# Patient Record
Sex: MALE | Race: WHITE | NOT HISPANIC OR LATINO | Employment: FULL TIME | URBAN - METROPOLITAN AREA
[De-identification: names, ages, dates, MRNs, and addresses within clinical notes are randomized per-mention and may not be internally consistent; named-entity substitution may affect disease eponyms.]

---

## 2019-08-19 ENCOUNTER — DOCUMENTATION (OUTPATIENT)
Dept: NEUROSURGERY | Facility: CLINIC | Age: 59
End: 2019-08-19

## 2019-09-03 ENCOUNTER — OFFICE VISIT (OUTPATIENT)
Dept: NEUROSURGERY | Facility: CLINIC | Age: 59
End: 2019-09-03

## 2019-09-03 VITALS
DIASTOLIC BLOOD PRESSURE: 94 MMHG | SYSTOLIC BLOOD PRESSURE: 158 MMHG | HEART RATE: 94 BPM | WEIGHT: 252 LBS | HEIGHT: 73 IN | BODY MASS INDEX: 33.4 KG/M2

## 2019-09-03 DIAGNOSIS — M54.16 LUMBAR RADICULOPATHY: Primary | ICD-10-CM

## 2019-09-03 PROCEDURE — 99204 OFFICE O/P NEW MOD 45 MIN: CPT | Performed by: NEUROLOGICAL SURGERY

## 2019-09-03 RX ORDER — METHOCARBAMOL 750 MG/1
TABLET, FILM COATED ORAL
COMMUNITY
Start: 2019-08-16 | End: 2019-09-17

## 2019-09-03 RX ORDER — DICLOFENAC SODIUM 75 MG/1
TABLET, DELAYED RELEASE ORAL
COMMUNITY
Start: 2019-07-15 | End: 2019-11-05

## 2019-09-03 RX ORDER — PANTOPRAZOLE SODIUM 40 MG/1
TABLET, DELAYED RELEASE ORAL
COMMUNITY
Start: 2019-08-16 | End: 2019-09-10

## 2019-09-03 RX ORDER — DEXAMETHASONE 4 MG/1
8 TABLET ORAL TAKE AS DIRECTED
Qty: 2 TABLET | Refills: 0 | Status: SHIPPED | OUTPATIENT
Start: 2019-09-03 | End: 2019-09-10 | Stop reason: HOSPADM

## 2019-09-06 NOTE — PROGRESS NOTES
Subjective   Patient ID: Philip Calloway is a 59 y.o. male is here today for follow-up with a new Lumbar Myelogram that was ordered at his last office visit 9/3/2019 for back pain that radiated into his right leg with numbness and tingling.  Today his symptoms are the same as his previous visit with the addition of pain in the right side of his abdomen.    History of Present Illness     This patient returns a day.  He continues with pain primarily in his right leg.  He has a little pain in the right side of his abdomen.  This morning.    The following portions of the patient's history were reviewed and updated as appropriate: allergies, current medications, past family history, past medical history, past social history, past surgical history and problem list.    Review of Systems   Constitutional: Positive for activity change.   Respiratory: Negative for chest tightness and shortness of breath.    Cardiovascular: Negative for chest pain.   Gastrointestinal: Positive for abdominal pain ( Right side).   Musculoskeletal: Positive for back pain, gait problem and myalgias.        Right leg pain   Neurological: Positive for numbness.        Positive for tingling   All other systems reviewed and are negative.      Objective   Physical Exam   Constitutional: He is oriented to person, place, and time. He appears well-developed and well-nourished.   Neurological: He is oriented to person, place, and time.     Neurologic Exam     Mental Status   Oriented to person, place, and time.       Assessment/Plan   Independent Review of Radiographic Studies:      I reviewed his plain films, myelogram, and CT scan myself.  The plain films show some degenerative change but no evidence of instability on flexion and extension.  On the myelogram itself there does appear to be some stenosis at the L3-4 level.  There is also a little narrowing at L2-3.  The nerves really fill out pretty well at all the levels of the lumbar spine.  There is some  epidural injection as well.  On the post myelographic CT scan the lower thoracic spine down to the L2 vertebral body looks okay.  There is some left-sided narrowing at L2-3 which is causing some mild pressure on the nerve at that level area L3-4 also shows a little bit of narrowing.  L4-5 for the most part looks okay.  This is where he has had previous surgery on the left.  L5-S1 mostly looks okay as well.    Medical Decision Making:      I told the patient and his wife about the imaging.  I suggested that we try some facet injections on the right side at L3-4 and L4-5.  I told him to call me a day or 2 after the injections have been done and if he is no better then we could consider some epidural blocks.  At some point we might want to check an MRI of his thoracic spine because of the abdominal pain but the CT goes all the way up to the T9-10 disc space.    Philip was seen today for back pain.    Diagnoses and all orders for this visit:    Lumbar radiculopathy  -     Facet Injection      Return for Recheck and call after treatment or consultation.

## 2019-09-09 ENCOUNTER — TELEPHONE (OUTPATIENT)
Dept: NEUROSURGERY | Facility: CLINIC | Age: 59
End: 2019-09-09

## 2019-09-10 ENCOUNTER — OFFICE VISIT (OUTPATIENT)
Dept: NEUROSURGERY | Facility: CLINIC | Age: 59
End: 2019-09-10

## 2019-09-10 ENCOUNTER — HOSPITAL ENCOUNTER (OUTPATIENT)
Dept: CT IMAGING | Facility: HOSPITAL | Age: 59
Discharge: HOME | End: 2019-09-10
Admitting: NEUROLOGICAL SURGERY

## 2019-09-10 ENCOUNTER — HOSPITAL ENCOUNTER (OUTPATIENT)
Dept: GENERAL RADIOLOGY | Facility: HOSPITAL | Age: 59
Discharge: HOME | End: 2019-09-10

## 2019-09-10 VITALS
HEART RATE: 79 BPM | HEIGHT: 72 IN | BODY MASS INDEX: 34.13 KG/M2 | TEMPERATURE: 97.9 F | DIASTOLIC BLOOD PRESSURE: 65 MMHG | WEIGHT: 252 LBS | RESPIRATION RATE: 16 BRPM | OXYGEN SATURATION: 95 % | SYSTOLIC BLOOD PRESSURE: 122 MMHG

## 2019-09-10 VITALS — DIASTOLIC BLOOD PRESSURE: 80 MMHG | HEART RATE: 76 BPM | SYSTOLIC BLOOD PRESSURE: 143 MMHG

## 2019-09-10 DIAGNOSIS — M54.16 LUMBAR RADICULOPATHY: ICD-10-CM

## 2019-09-10 DIAGNOSIS — M54.16 LUMBAR RADICULOPATHY: Primary | ICD-10-CM

## 2019-09-10 PROCEDURE — 99213 OFFICE O/P EST LOW 20 MIN: CPT | Performed by: NEUROLOGICAL SURGERY

## 2019-09-10 PROCEDURE — 25010000003 LIDOCAINE 1 % SOLUTION: Performed by: NEUROLOGICAL SURGERY

## 2019-09-10 PROCEDURE — 62304 MYELOGRAPHY LUMBAR INJECTION: CPT

## 2019-09-10 PROCEDURE — 72132 CT LUMBAR SPINE W/DYE: CPT

## 2019-09-10 PROCEDURE — 72114 X-RAY EXAM L-S SPINE BENDING: CPT

## 2019-09-10 PROCEDURE — 0 IOPAMIDOL 41 % SOLUTION: Performed by: NEUROLOGICAL SURGERY

## 2019-09-10 PROCEDURE — 72240 MYELOGRAPHY NECK SPINE: CPT

## 2019-09-10 RX ORDER — HYDROCODONE BITARTRATE AND ACETAMINOPHEN 5; 325 MG/1; MG/1
1 TABLET ORAL EVERY 4 HOURS PRN
Status: DISCONTINUED | OUTPATIENT
Start: 2019-09-10 | End: 2019-09-11 | Stop reason: HOSPADM

## 2019-09-10 RX ORDER — ACETAMINOPHEN 325 MG/1
650 TABLET ORAL EVERY 4 HOURS PRN
Status: DISCONTINUED | OUTPATIENT
Start: 2019-09-10 | End: 2019-09-11 | Stop reason: HOSPADM

## 2019-09-10 RX ORDER — LIDOCAINE HYDROCHLORIDE 10 MG/ML
10 INJECTION, SOLUTION INFILTRATION; PERINEURAL ONCE
Status: COMPLETED | OUTPATIENT
Start: 2019-09-10 | End: 2019-09-10

## 2019-09-10 RX ADMIN — IOPAMIDOL 20 ML: 408 INJECTION, SOLUTION INTRATHECAL at 07:54

## 2019-09-10 RX ADMIN — LIDOCAINE HYDROCHLORIDE 6 ML: 10 INJECTION, SOLUTION INFILTRATION; PERINEURAL at 07:54

## 2019-09-10 NOTE — NURSING NOTE
Returned to triage. Dressing to low back dry and intact. No complaint of headache. No distress. Fluids encouraged. Awaiting to complete X-rays.

## 2019-09-10 NOTE — DISCHARGE INSTRUCTIONS
EDUCATION /DISCHARGE INSTRUCTIONS:    A myelogram is a special radiology procedure of the spinal cord, spinal nerves and other related structures.  You will be awake during the examination.  An area of your lower back will be cleansed with an antiseptic solution.  The physician will inject a numbing medication in your lower back.  While your back is numb, a needle will be placed in the lower back area.  A small amount of spinal fluid may be withdrawn and sent to the lab if ordered by your physician. While the needle is in the back, an injection of a contrast material (xray dye) will be given through the needle.  The contrast material will allow the physician to see the spinal cord and spinal nerves.  Once injected, the needle will be removed and a band aid will be placed over the injection site.  The table will be tilted during the process to allow the contrast material to flow to particular areas in the spine.  Following the injection and xrays, you will be taken to the CT scan where more pictures will be taken. After the procedure is finished, the contrast material will be absorbed by your body and eliminated through your kidneys.  The radiologist will study and interpret your myelogram and send the results to your physician.  Procedure risks of a myelogram include, but are not limited to:  *  Bleeding   *  seizure  *  Infection   *  Headache, possibly severe requiring  *  Contrast reaction      a blood patch  *  Nerve or cord injury  *  Paralysis and death  Benefits of the procedure:  *  Best examination for delineating pathology related to spinal cord compression from a    disc and/or nerve root compression  Alternatives to the procedure:  MRI - a non invasive procedure requiring intravenous contrast injection.  Cannot be done on patients with certain pacemakers or metal in the body.  MRI risks include possible reaction to the contrast material, movement of metal located in the body.  Benefit to MRI:   Non-invasive and usually painless procedure.  THIS EDUCATION INFORMATION WAS REVIEWED PRIOR TO PROCEDURE AND CONSENT. Patient initials________________Time__________________    24 hour rest period ends ____________________.  Important information following your myelogram:  * ACTIVITY:   *  Lie down with your head elevated no more than 2 pillows high today & tonight  *  Sit up to eat your meals and use the restroom, otherwise, lie down.  *  Remain less active for two to three days.  *  Do not drive for 48 hours following a myelogram  *  You may remove the bandage and shower in the morning  *  Increase your fluids for the next 24 hours.  Caffeinated drinks are encouraged.  Resume taking blood thinner or aspirin on Wednesday Sept 11th after 11am    CALL YOUR PHYSICIAN FOR THE FOLLOWING:  * Pain at the injection site  * Reddness, swelling, bruising or drainage at the injection site.  * A fever by mouth of 101.0 or any new symptoms  Headaches are a common side effect after a myelogram.  If you get a headache, you should stay flat in bed and drink plenty of fluids. If the headache persist and does not go away with rest/medication, CALL Dr. Esquivel at [phone]

## 2019-09-11 ENCOUNTER — TELEPHONE (OUTPATIENT)
Dept: INTERVENTIONAL RADIOLOGY/VASCULAR | Facility: HOSPITAL | Age: 59
End: 2019-09-11

## 2019-09-17 ENCOUNTER — HOSPITAL ENCOUNTER (OUTPATIENT)
Dept: GENERAL RADIOLOGY | Facility: HOSPITAL | Age: 59
Discharge: HOME | End: 2019-09-17

## 2019-09-17 ENCOUNTER — ANESTHESIA (OUTPATIENT)
Dept: PAIN MEDICINE | Facility: HOSPITAL | Age: 59
End: 2019-09-17

## 2019-09-17 ENCOUNTER — ANESTHESIA EVENT (OUTPATIENT)
Dept: PAIN MEDICINE | Facility: HOSPITAL | Age: 59
End: 2019-09-17

## 2019-09-17 ENCOUNTER — HOSPITAL ENCOUNTER (OUTPATIENT)
Dept: PAIN MEDICINE | Facility: HOSPITAL | Age: 59
Discharge: HOME | End: 2019-09-17
Admitting: ANESTHESIOLOGY

## 2019-09-17 VITALS
SYSTOLIC BLOOD PRESSURE: 139 MMHG | WEIGHT: 252 LBS | OXYGEN SATURATION: 96 % | RESPIRATION RATE: 16 BRPM | BODY MASS INDEX: 33.4 KG/M2 | HEIGHT: 73 IN | HEART RATE: 71 BPM | DIASTOLIC BLOOD PRESSURE: 92 MMHG

## 2019-09-17 DIAGNOSIS — M54.16 LUMBAR RADICULOPATHY: Primary | ICD-10-CM

## 2019-09-17 DIAGNOSIS — R52 PAIN: ICD-10-CM

## 2019-09-17 PROCEDURE — 77003 FLUOROGUIDE FOR SPINE INJECT: CPT

## 2019-09-17 PROCEDURE — 25010000002 METHYLPREDNISOLONE PER 80 MG: Performed by: ANESTHESIOLOGY

## 2019-09-17 RX ORDER — LIDOCAINE HYDROCHLORIDE 10 MG/ML
1 INJECTION, SOLUTION INFILTRATION; PERINEURAL ONCE
Status: DISCONTINUED | OUTPATIENT
Start: 2019-09-17 | End: 2019-09-18 | Stop reason: HOSPADM

## 2019-09-17 RX ORDER — FENTANYL CITRATE 50 UG/ML
50 INJECTION, SOLUTION INTRAMUSCULAR; INTRAVENOUS AS NEEDED
Status: DISCONTINUED | OUTPATIENT
Start: 2019-09-17 | End: 2019-09-18 | Stop reason: HOSPADM

## 2019-09-17 RX ORDER — MIDAZOLAM HYDROCHLORIDE 1 MG/ML
1 INJECTION INTRAMUSCULAR; INTRAVENOUS
Status: DISCONTINUED | OUTPATIENT
Start: 2019-09-17 | End: 2019-09-18 | Stop reason: HOSPADM

## 2019-09-17 RX ORDER — LIDOCAINE HYDROCHLORIDE 20 MG/ML
10 INJECTION, SOLUTION INFILTRATION; PERINEURAL ONCE
Status: COMPLETED | OUTPATIENT
Start: 2019-09-17 | End: 2019-09-17

## 2019-09-17 RX ORDER — METHYLPREDNISOLONE ACETATE 80 MG/ML
80 INJECTION, SUSPENSION INTRA-ARTICULAR; INTRALESIONAL; INTRAMUSCULAR; SOFT TISSUE ONCE
Status: COMPLETED | OUTPATIENT
Start: 2019-09-17 | End: 2019-09-17

## 2019-09-17 RX ADMIN — LIDOCAINE HYDROCHLORIDE 10 ML: 20 INJECTION, SOLUTION INFILTRATION; PERINEURAL at 13:13

## 2019-09-17 RX ADMIN — METHYLPREDNISOLONE ACETATE 80 MG: 80 INJECTION, SUSPENSION INTRA-ARTICULAR; INTRALESIONAL; INTRAMUSCULAR; SOFT TISSUE at 13:13

## 2019-09-17 NOTE — H&P
[facility]    History and Physical    Patient Name: Philip Calloway  :  1960  MRN:  [id number]  Date of Admission: 2019    Subjective     Patient is a 59 y.o. male presents with chief complaint of  Right leg pain- aching, burning, muscle spasme, numbness, 3-7/10, ? etiology.  Onset of symptoms was gradual starting a few months ago.  Symptoms are associated/aggravated by nothing in particular. Symptoms improve with pain medication, lying down and rest    The following portions of the patients history were reviewed and updated as appropriate: current medications, allergies, past medical history, past surgical history, past family history, past social history and problem list                Objective     Past Medical History:   Past Medical History:   Diagnosis Date   • Hypertension      Past Surgical History:   Past Surgical History:   Procedure Laterality Date   • BACK SURGERY  1998   • HERNIA REPAIR  2016   • SPINE SURGERY       Family History:   Family History   Problem Relation Age of Onset   • Diabetes Mother    • Cancer Father      Social History:   Social History     Tobacco Use   • Smoking status: Former Smoker     Packs/day: 1.00     Years: 4.00     Pack years: 4.00     Types: Cigarettes   • Smokeless tobacco: Never Used   • Tobacco comment: smoked Cigars before Cigarettes for 20 yrs   Substance Use Topics   • Alcohol use: Yes     Comment: socially   • Drug use: No       Vital Signs Range for the last 24 hours  Temperature:     Temp Source:     BP:     Pulse:     Respirations:     SPO2:     O2 Amount (l/min):     O2 Devices     Weight:           --------------------------------------------------------------------------------    Current Outpatient Medications   Medication Sig Dispense Refill   • diclofenac (VOLTAREN) 75 MG EC tablet      • methocarbamol (ROBAXIN) 750 MG tablet      • oxyCODONE-acetaminophen (PERCOCET)  MG per tablet Take 1 tablet by mouth Every 8 (Eight) Hours As  Needed for Moderate Pain .     • valsartan (DIOVAN) 320 MG tablet Take 320 mg by mouth Daily.       Current Facility-Administered Medications   Medication Dose Route Frequency Provider Last Rate Last Dose   • fentaNYL citrate (PF) (SUBLIMAZE) injection 50 mcg  50 mcg Intravenous PRN Christopher Bennett MD       • iopamidol (ISOVUE-M 200) injection 41%  3 mL Epidural Once in imaging Christopher Bennett MD       • lidocaine (XYLOCAINE) 1 % injection 1 mL  1 mL Intradermal Once Christopher Bennett MD       • lidocaine (XYLOCAINE) 2% injection 10 mL  10 mL Infiltration Once Christopher Bennett MD       • methylPREDNISolone acetate (DEPO-medrol) injection 80 mg  80 mg Intramuscular Once Christopher Bennett MD       • midazolam (VERSED) injection 1 mg  1 mg Intravenous Q5 Min PRN Christopher Bennett MD           --------------------------------------------------------------------------------  Assessment/Plan      Anesthesia Evaluation     Patient summary reviewed and Nursing notes reviewed         Pain impairs ability to perform ADLs: Dressing, Sleeping, Driving and Working  Modalities previously tried to control pain with limited effectiveness within the last 4-6 weeks: Physical therapy, Rest, Prescription medications and Steroids     Airway   Mallampati: II  Dental - normal exam     Pulmonary - negative pulmonary ROS and normal exam   Cardiovascular - normal exam    (+) hypertension,       Neuro/Psych- negative ROS    PE Comment: DTR decreased right patella and achilles  GI/Hepatic/Renal/Endo - negative ROS     Musculoskeletal (-) negative ROS        PE comment: Right Foot dorsi flexion / extension 4/5  Right knee flexion / extension 4/5    All left muscle 5/5  Abdominal  - normal exam   Substance History - negative use     OB/GYN negative ob/gyn ROS         Other                 Diagnosis and Plan    Treatment Plan  ASA 2      Procedures: With fluoroscopy,       Anesthetic plan and risks discussed with patient.      Lumbar 3-4, 4-5,  "right , medial branch block    Diagnosis     * Lumbar radiculopathy [M54.16]     * Lumbar degenerative disc disease [M51.36]     * Lumbosacral spondylosis without myelopathy [M47.817]            CHIEF COMPLAINT:       HISTORY OF PRESENT ILLNESS:      PAST MEDICAL HISTORY:  Current Outpatient Medications on File Prior to Encounter   Medication Sig Dispense Refill   • diclofenac (VOLTAREN) 75 MG EC tablet      • methocarbamol (ROBAXIN) 750 MG tablet      • oxyCODONE-acetaminophen (PERCOCET)  MG per tablet Take 1 tablet by mouth Every 8 (Eight) Hours As Needed for Moderate Pain .     • valsartan (DIOVAN) 320 MG tablet Take 320 mg by mouth Daily.       No current facility-administered medications on file prior to encounter.        Past Medical History:   Diagnosis Date   • Hypertension          SOCIAL HISTORY:  No tobacco    REVIEW OF SYSTEMS:  No hematologic infectious or constitutional symptoms  Other review of systems non-contributory    PHYSICAL EXAM:  Ht 185.4 cm (73\")   Wt 114 kg (252 lb)   BMI 33.25 kg/m²   Well-developed well-nourished no acute distress  Extra ocular movements intact  Mallampati class 2 airway  Cardiac:  Regular rate and rhythm  Lungs:  Clear to auscultation bilaterally with good effort  Alert and oriented ×3  Deep tendon reflexes abnormal in the right  Patella/ achilles  Negative straight leg raise bilaterally  5 out of 5 strength bilateral upper and lower extremities  Except right leg/ foot 4/5  Lumbar spine without obvious deformities ecchymoses  Lumbar spine nontender to palpation      DIAGNOSIS:  Post-Op Diagnosis Codes:     * Lumbar radiculopathy [M54.16]     * Lumbar degenerative disc disease [M51.36]     * Lumbosacral spondylosis without myelopathy [M47.817]    PLAN:  1After medial branch block trial,  Possible Lumbar 4 epidural steroid injections, up to 3, spaced 1-2 weeks apart.  If pain control is acceptable after 1 or 2 injections, it was discussed with the patient that they " may return for the subsequent injections if and when their pain returns.  The risks were discussed with the patient including failure of relief, worsening pain, Headache (post dural puncture headache), bleeding (epidural hematoma) and infection (epidural abscess or skin infection).  2.  Physical therapy exercises at home as prescribed by physical therapy or from the pain clinic handout (given to the patient).  Continuation of these exercises every day, or multiple times per week, even when the patient has good pain relief, was stressed to the patient as a preventative measure to decrease the frequency and severity of future pain episodes.  3.  Continue pain medicines as already prescribed.  If patient not currently taking any, it is recommended to begin Acetaminophen 1000 mg po q 8 hours.  If other medicines containing Acetaminophen are currently prescribed, maintain daily dose at 3000 mg.    4.  If they can tolerate NSAIDS, it is recommended to take Ibuprofen 600 mg po q 6 hours for 7 days during pain exacerbations.  Alternatively, they may substitute an NSAID of their choice (e.g. Aleve).  This may be taken at the same time as Acetaminophen.  5.  Heat and ice to the affected area as tolerated for pain control.  It was discussed that heating pads can cause burns.  6.  Daily low impact exercise such as walking or water exercise was recommended to maintain overall health and aid in weight control.   7.  Follow up as needed for subsequent injections.  8.  Patient was counseled to abstain from tobacco products.    Target : L 4-5

## 2019-09-17 NOTE — H&P (VIEW-ONLY)
[facility]    History and Physical    Patient Name: Philip Calloway  :  1960  MRN:  [id number]  Date of Admission: 2019    Subjective     Patient is a 59 y.o. male presents with chief complaint of  Right leg pain- aching, burning, muscle spasme, numbness, 3-7/10, ? etiology.  Onset of symptoms was gradual starting a few months ago.  Symptoms are associated/aggravated by nothing in particular. Symptoms improve with pain medication, lying down and rest    The following portions of the patients history were reviewed and updated as appropriate: current medications, allergies, past medical history, past surgical history, past family history, past social history and problem list                Objective     Past Medical History:   Past Medical History:   Diagnosis Date   • Hypertension      Past Surgical History:   Past Surgical History:   Procedure Laterality Date   • BACK SURGERY  1998   • HERNIA REPAIR  2016   • SPINE SURGERY       Family History:   Family History   Problem Relation Age of Onset   • Diabetes Mother    • Cancer Father      Social History:   Social History     Tobacco Use   • Smoking status: Former Smoker     Packs/day: 1.00     Years: 4.00     Pack years: 4.00     Types: Cigarettes   • Smokeless tobacco: Never Used   • Tobacco comment: smoked Cigars before Cigarettes for 20 yrs   Substance Use Topics   • Alcohol use: Yes     Comment: socially   • Drug use: No       Vital Signs Range for the last 24 hours  Temperature:     Temp Source:     BP:     Pulse:     Respirations:     SPO2:     O2 Amount (l/min):     O2 Devices     Weight:           --------------------------------------------------------------------------------    Current Outpatient Medications   Medication Sig Dispense Refill   • diclofenac (VOLTAREN) 75 MG EC tablet      • methocarbamol (ROBAXIN) 750 MG tablet      • oxyCODONE-acetaminophen (PERCOCET)  MG per tablet Take 1 tablet by mouth Every 8 (Eight) Hours As  Needed for Moderate Pain .     • valsartan (DIOVAN) 320 MG tablet Take 320 mg by mouth Daily.       Current Facility-Administered Medications   Medication Dose Route Frequency Provider Last Rate Last Dose   • fentaNYL citrate (PF) (SUBLIMAZE) injection 50 mcg  50 mcg Intravenous PRN Christopher Bennett MD       • iopamidol (ISOVUE-M 200) injection 41%  3 mL Epidural Once in imaging Christopher Bennett MD       • lidocaine (XYLOCAINE) 1 % injection 1 mL  1 mL Intradermal Once Christopher Bennett MD       • lidocaine (XYLOCAINE) 2% injection 10 mL  10 mL Infiltration Once Christopher Bennett MD       • methylPREDNISolone acetate (DEPO-medrol) injection 80 mg  80 mg Intramuscular Once Christopher Bennett MD       • midazolam (VERSED) injection 1 mg  1 mg Intravenous Q5 Min PRN Christopher Bennett MD           --------------------------------------------------------------------------------  Assessment/Plan      Anesthesia Evaluation     Patient summary reviewed and Nursing notes reviewed         Pain impairs ability to perform ADLs: Dressing, Sleeping, Driving and Working  Modalities previously tried to control pain with limited effectiveness within the last 4-6 weeks: Physical therapy, Rest, Prescription medications and Steroids     Airway   Mallampati: II  Dental - normal exam     Pulmonary - negative pulmonary ROS and normal exam   Cardiovascular - normal exam    (+) hypertension,       Neuro/Psych- negative ROS    PE Comment: DTR decreased right patella and achilles  GI/Hepatic/Renal/Endo - negative ROS     Musculoskeletal (-) negative ROS        PE comment: Right Foot dorsi flexion / extension 4/5  Right knee flexion / extension 4/5    All left muscle 5/5  Abdominal  - normal exam   Substance History - negative use     OB/GYN negative ob/gyn ROS         Other                 Diagnosis and Plan    Treatment Plan  ASA 2      Procedures: With fluoroscopy,       Anesthetic plan and risks discussed with patient.      Lumbar 3-4, 4-5,  "right , medial branch block    Diagnosis     * Lumbar radiculopathy [M54.16]     * Lumbar degenerative disc disease [M51.36]     * Lumbosacral spondylosis without myelopathy [M47.817]            CHIEF COMPLAINT:       HISTORY OF PRESENT ILLNESS:      PAST MEDICAL HISTORY:  Current Outpatient Medications on File Prior to Encounter   Medication Sig Dispense Refill   • diclofenac (VOLTAREN) 75 MG EC tablet      • methocarbamol (ROBAXIN) 750 MG tablet      • oxyCODONE-acetaminophen (PERCOCET)  MG per tablet Take 1 tablet by mouth Every 8 (Eight) Hours As Needed for Moderate Pain .     • valsartan (DIOVAN) 320 MG tablet Take 320 mg by mouth Daily.       No current facility-administered medications on file prior to encounter.        Past Medical History:   Diagnosis Date   • Hypertension          SOCIAL HISTORY:  No tobacco    REVIEW OF SYSTEMS:  No hematologic infectious or constitutional symptoms  Other review of systems non-contributory    PHYSICAL EXAM:  Ht 185.4 cm (73\")   Wt 114 kg (252 lb)   BMI 33.25 kg/m²   Well-developed well-nourished no acute distress  Extra ocular movements intact  Mallampati class 2 airway  Cardiac:  Regular rate and rhythm  Lungs:  Clear to auscultation bilaterally with good effort  Alert and oriented ×3  Deep tendon reflexes abnormal in the right  Patella/ achilles  Negative straight leg raise bilaterally  5 out of 5 strength bilateral upper and lower extremities  Except right leg/ foot 4/5  Lumbar spine without obvious deformities ecchymoses  Lumbar spine nontender to palpation      DIAGNOSIS:  Post-Op Diagnosis Codes:     * Lumbar radiculopathy [M54.16]     * Lumbar degenerative disc disease [M51.36]     * Lumbosacral spondylosis without myelopathy [M47.817]    PLAN:  1After medial branch block trial,  Possible Lumbar 4 epidural steroid injections, up to 3, spaced 1-2 weeks apart.  If pain control is acceptable after 1 or 2 injections, it was discussed with the patient that they " may return for the subsequent injections if and when their pain returns.  The risks were discussed with the patient including failure of relief, worsening pain, Headache (post dural puncture headache), bleeding (epidural hematoma) and infection (epidural abscess or skin infection).  2.  Physical therapy exercises at home as prescribed by physical therapy or from the pain clinic handout (given to the patient).  Continuation of these exercises every day, or multiple times per week, even when the patient has good pain relief, was stressed to the patient as a preventative measure to decrease the frequency and severity of future pain episodes.  3.  Continue pain medicines as already prescribed.  If patient not currently taking any, it is recommended to begin Acetaminophen 1000 mg po q 8 hours.  If other medicines containing Acetaminophen are currently prescribed, maintain daily dose at 3000 mg.    4.  If they can tolerate NSAIDS, it is recommended to take Ibuprofen 600 mg po q 6 hours for 7 days during pain exacerbations.  Alternatively, they may substitute an NSAID of their choice (e.g. Aleve).  This may be taken at the same time as Acetaminophen.  5.  Heat and ice to the affected area as tolerated for pain control.  It was discussed that heating pads can cause burns.  6.  Daily low impact exercise such as walking or water exercise was recommended to maintain overall health and aid in weight control.   7.  Follow up as needed for subsequent injections.  8.  Patient was counseled to abstain from tobacco products.    Target : L 4-5

## 2019-09-17 NOTE — ANESTHESIA PROCEDURE NOTES
Medial branch block    Pre-sedation assessment completed: 9/17/2019 1:05 PM    Patient reassessed immediately prior to procedure    Patient location during procedure: pain clinic  Start Time: 9/17/2019 1:05 PM  Stop Time: 9/17/2019 1:15 PM  Indication:at surgeon's request and procedure for pain  Performed By  Anesthesiologist: Christopher Bennett MD  Preanesthetic Checklist  Completed: patient identified, site marked, surgical consent, pre-op evaluation, timeout performed, IV checked, risks and benefits discussed and monitors and equipment checked  Additional Notes  Dx:  Post-Op Diagnosis Codes:     * Lumbar radiculopathy (M54.16)     * Lumbar degenerative disc disease (M51.36)     * Lumbosacral spondylosis without myelopathy (M47.817)      Plan : return to clinic as needed  Prep:  Pt Position:prone (prone)  Sterile Tech:cap, gloves, mask and sterile barrier  Prep:chlorhexidine gluconate and isopropyl alcohol  Monitoring:blood pressure monitoring, EKG and continuous pulse oximetry  Procedure:Sedation: no     Approach:right paramedian  Guidance: fluoroscopy and c arm pa and lat and loss of resistance  Location:lumbar  Interspace: L3-4, L4-5.  Needle Type:Other  Needle Gauge:22 G  Aspiration:negative  Medications:  Preservative Free Saline:3mL  Comments:Lidocaine 2%  1cc  Post Assessment:  Post-procedure: bandaid.  Pt Tolerance:patient tolerated the procedure well with no apparent complications  Complications:no

## 2019-09-25 ENCOUNTER — ANESTHESIA EVENT (OUTPATIENT)
Dept: PAIN MEDICINE | Facility: HOSPITAL | Age: 59
End: 2019-09-25

## 2019-09-25 ENCOUNTER — HOSPITAL ENCOUNTER (OUTPATIENT)
Dept: PAIN MEDICINE | Facility: HOSPITAL | Age: 59
Discharge: HOME | End: 2019-09-25
Admitting: ANESTHESIOLOGY

## 2019-09-25 ENCOUNTER — ANESTHESIA (OUTPATIENT)
Dept: PAIN MEDICINE | Facility: HOSPITAL | Age: 59
End: 2019-09-25

## 2019-09-25 ENCOUNTER — HOSPITAL ENCOUNTER (OUTPATIENT)
Dept: GENERAL RADIOLOGY | Facility: HOSPITAL | Age: 59
Discharge: HOME | End: 2019-09-25

## 2019-09-25 VITALS
DIASTOLIC BLOOD PRESSURE: 85 MMHG | HEART RATE: 63 BPM | SYSTOLIC BLOOD PRESSURE: 122 MMHG | RESPIRATION RATE: 16 BRPM | TEMPERATURE: 98 F | OXYGEN SATURATION: 95 %

## 2019-09-25 DIAGNOSIS — M54.16 LUMBAR RADICULOPATHY: ICD-10-CM

## 2019-09-25 DIAGNOSIS — R52 PAIN: ICD-10-CM

## 2019-09-25 PROCEDURE — C1755 CATHETER, INTRASPINAL: HCPCS

## 2019-09-25 PROCEDURE — 25010000002 MIDAZOLAM PER 1 MG: Performed by: ANESTHESIOLOGY

## 2019-09-25 PROCEDURE — 25010000002 METHYLPREDNISOLONE PER 80 MG: Performed by: ANESTHESIOLOGY

## 2019-09-25 PROCEDURE — 77003 FLUOROGUIDE FOR SPINE INJECT: CPT

## 2019-09-25 RX ORDER — SODIUM CHLORIDE 0.9 % (FLUSH) 0.9 %
3-10 SYRINGE (ML) INJECTION AS NEEDED
Status: DISCONTINUED | OUTPATIENT
Start: 2019-09-25 | End: 2019-09-26 | Stop reason: HOSPADM

## 2019-09-25 RX ORDER — MIDAZOLAM HYDROCHLORIDE 1 MG/ML
1 INJECTION INTRAMUSCULAR; INTRAVENOUS AS NEEDED
Status: DISCONTINUED | OUTPATIENT
Start: 2019-09-25 | End: 2019-09-26 | Stop reason: HOSPADM

## 2019-09-25 RX ORDER — SODIUM CHLORIDE 0.9 % (FLUSH) 0.9 %
1-10 SYRINGE (ML) INJECTION AS NEEDED
Status: DISCONTINUED | OUTPATIENT
Start: 2019-09-25 | End: 2019-09-26 | Stop reason: HOSPADM

## 2019-09-25 RX ORDER — METHYLPREDNISOLONE ACETATE 80 MG/ML
80 INJECTION, SUSPENSION INTRA-ARTICULAR; INTRALESIONAL; INTRAMUSCULAR; SOFT TISSUE ONCE
Status: COMPLETED | OUTPATIENT
Start: 2019-09-25 | End: 2019-09-25

## 2019-09-25 RX ORDER — FENTANYL CITRATE 50 UG/ML
50 INJECTION, SOLUTION INTRAMUSCULAR; INTRAVENOUS AS NEEDED
Status: DISCONTINUED | OUTPATIENT
Start: 2019-09-25 | End: 2019-09-26 | Stop reason: HOSPADM

## 2019-09-25 RX ORDER — LIDOCAINE HYDROCHLORIDE 10 MG/ML
1 INJECTION, SOLUTION INFILTRATION; PERINEURAL ONCE AS NEEDED
Status: DISCONTINUED | OUTPATIENT
Start: 2019-09-25 | End: 2019-09-26 | Stop reason: HOSPADM

## 2019-09-25 RX ORDER — LIDOCAINE HYDROCHLORIDE 10 MG/ML
0.5 INJECTION, SOLUTION INFILTRATION; PERINEURAL ONCE AS NEEDED
Status: DISCONTINUED | OUTPATIENT
Start: 2019-09-25 | End: 2019-09-26 | Stop reason: HOSPADM

## 2019-09-25 RX ORDER — SODIUM CHLORIDE 0.9 % (FLUSH) 0.9 %
3 SYRINGE (ML) INJECTION EVERY 12 HOURS SCHEDULED
Status: DISCONTINUED | OUTPATIENT
Start: 2019-09-25 | End: 2019-09-26 | Stop reason: HOSPADM

## 2019-09-25 RX ADMIN — METHYLPREDNISOLONE ACETATE 80 MG: 80 INJECTION, SUSPENSION INTRA-ARTICULAR; INTRALESIONAL; INTRAMUSCULAR; SOFT TISSUE at 11:04

## 2019-09-25 RX ADMIN — MIDAZOLAM 1 MG: 1 INJECTION INTRAMUSCULAR; INTRAVENOUS at 11:01

## 2019-09-25 NOTE — ANESTHESIA PROCEDURE NOTES
PAIN Epidural block    Pre-sedation assessment completed: 9/25/2019 10:42 AM    Patient reassessed immediately prior to procedure    Patient location during procedure: pain clinic  Start Time: 9/25/2019 11:01 AM  Stop Time: 9/25/2019 11:05 AM  Indication:procedure for pain  Performed By  Anesthesiologist: Deb Bates MD  Preanesthetic Checklist  Completed: patient identified, site marked, surgical consent, pre-op evaluation, timeout performed, IV checked, risks and benefits discussed and monitors and equipment checked  Additional Notes  Lumbar radiculopathy  Fluoro used.    Prep:  Pt Position:prone  Sterile Tech:cap, gloves, mask and sterile barrier  Prep:chlorhexidine gluconate and isopropyl alcohol  Monitoring:blood pressure monitoring, continuous pulse oximetry and EKG  Procedure:Sedation: yes     Approach:right paramedian  Guidance: fluoroscopy  Location:lumbar  Level:4-5  Needle Type:Tuohy  Needle Gauge:20  Aspiration:negative  Test Dose:negative  Medications:  Depomedrol:80  Preservative Free Saline:3mL    Post Assessment:  Dressing:occlusive dressing applied  Pt Tolerance:patient tolerated the procedure well with no apparent complications  Complications:no

## 2019-09-25 NOTE — INTERVAL H&P NOTE
[facility]  H&P reviewed. No changes since last visit.  Patient states   25-50% improvement since the last procedure/injection.  Pain relief from prior lumbar medial nerve branch blocks lasted 3-4 days.  Pain has returned.  Primarily c/o right leg pain/numbness.  Presents for lesi.      Diagnosis     * Lumbar radiculopathy [M54.16]      Airway assessed since last visit. Airway class equals: 2.

## 2019-11-05 ENCOUNTER — ANESTHESIA (OUTPATIENT)
Dept: PAIN MEDICINE | Facility: HOSPITAL | Age: 59
End: 2019-11-05

## 2019-11-05 ENCOUNTER — ANESTHESIA EVENT (OUTPATIENT)
Dept: PAIN MEDICINE | Facility: HOSPITAL | Age: 59
End: 2019-11-05

## 2019-11-05 ENCOUNTER — HOSPITAL ENCOUNTER (OUTPATIENT)
Dept: PAIN MEDICINE | Facility: HOSPITAL | Age: 59
Discharge: HOME | End: 2019-11-05
Admitting: ANESTHESIOLOGY

## 2019-11-05 ENCOUNTER — HOSPITAL ENCOUNTER (OUTPATIENT)
Dept: GENERAL RADIOLOGY | Facility: HOSPITAL | Age: 59
Discharge: HOME | End: 2019-11-05

## 2019-11-05 VITALS
DIASTOLIC BLOOD PRESSURE: 91 MMHG | OXYGEN SATURATION: 94 % | TEMPERATURE: 97.7 F | HEART RATE: 60 BPM | RESPIRATION RATE: 16 BRPM | SYSTOLIC BLOOD PRESSURE: 137 MMHG

## 2019-11-05 DIAGNOSIS — M54.16 LUMBAR RADICULOPATHY: ICD-10-CM

## 2019-11-05 DIAGNOSIS — R52 PAIN: ICD-10-CM

## 2019-11-05 PROCEDURE — C1755 CATHETER, INTRASPINAL: HCPCS

## 2019-11-05 PROCEDURE — 77003 FLUOROGUIDE FOR SPINE INJECT: CPT

## 2019-11-05 PROCEDURE — 25010000002 METHYLPREDNISOLONE PER 80 MG: Performed by: ANESTHESIOLOGY

## 2019-11-05 PROCEDURE — 0 IOPAMIDOL 41 % SOLUTION: Performed by: ANESTHESIOLOGY

## 2019-11-05 RX ORDER — MELOXICAM 7.5 MG/1
7.5 TABLET ORAL DAILY
COMMUNITY
End: 2020-01-06 | Stop reason: ALTCHOICE

## 2019-11-05 RX ORDER — SODIUM CHLORIDE 0.9 % (FLUSH) 0.9 %
3 SYRINGE (ML) INJECTION EVERY 12 HOURS SCHEDULED
Status: DISCONTINUED | OUTPATIENT
Start: 2019-11-05 | End: 2019-11-06 | Stop reason: HOSPADM

## 2019-11-05 RX ORDER — METHYLPREDNISOLONE ACETATE 80 MG/ML
80 INJECTION, SUSPENSION INTRA-ARTICULAR; INTRALESIONAL; INTRAMUSCULAR; SOFT TISSUE ONCE
Status: COMPLETED | OUTPATIENT
Start: 2019-11-05 | End: 2019-11-05

## 2019-11-05 RX ORDER — FENTANYL CITRATE 50 UG/ML
50 INJECTION, SOLUTION INTRAMUSCULAR; INTRAVENOUS AS NEEDED
Status: DISCONTINUED | OUTPATIENT
Start: 2019-11-05 | End: 2019-11-06 | Stop reason: HOSPADM

## 2019-11-05 RX ORDER — MIDAZOLAM HYDROCHLORIDE 1 MG/ML
1 INJECTION INTRAMUSCULAR; INTRAVENOUS AS NEEDED
Status: DISCONTINUED | OUTPATIENT
Start: 2019-11-05 | End: 2019-11-06 | Stop reason: HOSPADM

## 2019-11-05 RX ORDER — SODIUM CHLORIDE 0.9 % (FLUSH) 0.9 %
3-10 SYRINGE (ML) INJECTION AS NEEDED
Status: DISCONTINUED | OUTPATIENT
Start: 2019-11-05 | End: 2019-11-06 | Stop reason: HOSPADM

## 2019-11-05 RX ORDER — LIDOCAINE HYDROCHLORIDE 10 MG/ML
1 INJECTION, SOLUTION INFILTRATION; PERINEURAL ONCE
Status: DISCONTINUED | OUTPATIENT
Start: 2019-11-05 | End: 2019-11-06 | Stop reason: HOSPADM

## 2019-11-05 RX ADMIN — METHYLPREDNISOLONE ACETATE 80 MG: 80 INJECTION, SUSPENSION INTRA-ARTICULAR; INTRALESIONAL; INTRAMUSCULAR; SOFT TISSUE at 15:18

## 2019-11-05 RX ADMIN — IOPAMIDOL 10 ML: 408 INJECTION, SOLUTION INTRATHECAL at 15:18

## 2019-11-05 NOTE — ANESTHESIA PROCEDURE NOTES
PAIN Epidural block    Pre-sedation assessment completed: 11/5/2019 3:07 PM    Patient reassessed immediately prior to procedure    Patient location during procedure: pain clinic  Start Time: 11/5/2019 3:11 PM  Stop Time: 11/5/2019 3:19 PM  Indication:procedure for pain  Performed By  Anesthesiologist: Chaz Bliss MD  Preanesthetic Checklist  Completed: patient identified, site marked, surgical consent, pre-op evaluation, timeout performed, IV checked, risks and benefits discussed and monitors and equipment checked  Additional Notes  Post-Op Diagnosis Codes:     * Lumbar radiculopathy (M54.16)    The patient was observed in recovery with no evidence of neurological deficits or other problems. All questions were answered. The patient was discharged with appropriate instructions.  Prep:  Pt Position:prone  Sterile Tech:cap, gloves, mask and sterile barrier  Prep:chlorhexidine gluconate and isopropyl alcohol  Monitoring:blood pressure monitoring, continuous pulse oximetry and EKG  Procedure:Sedation: no     Approach: Right of midline.  Guidance: fluoroscopy  Location:lumbar  Level:3-4  Needle Type:Tuohy  Needle Gauge:20 G  Aspiration:negative  Medications:  Depomedrol:80 mg  Preservative Free Saline:3mL  Isovue:1mL  Comments:Appropriate epidural spread of contrast.   Post Assessment:  Post-procedure: Dressing per nursing.  Pt Tolerance:patient tolerated the procedure well with no apparent complications  Complications:no

## 2019-11-05 NOTE — H&P
CHIEF COMPLAINT:  Low back pain        HISTORY OF PRESENT ILLNESS:  This patient is complaining of low back pain which radiates to his right anterior thigh and right groin.  The pain is significantly decreasing the patient's Activities of Daily Living.  His prior epidural was at the L4-L5 level which is also his postsurgical level (he had a discectomy).    The patient states that the prior epidural provided significant relief, greater than 50%, that has faded.  It also increase his activities of daily living.  He states that he and his physical therapist are wondering if we should try moving up one level based on his physical exam symptoms.    This patient was referred to our clinic by Dr. Samuel Esquivel for lumbar epidural steroid injections.        PAST MEDICAL HISTORY:  Current Outpatient Medications on File Prior to Encounter   Medication Sig Dispense Refill   • meloxicam (MOBIC) 7.5 MG tablet Take 7.5 mg by mouth Daily.     • oxyCODONE-acetaminophen (PERCOCET)  MG per tablet Take 1 tablet by mouth Every 8 (Eight) Hours As Needed for Moderate Pain .     • valsartan (DIOVAN) 320 MG tablet Take 320 mg by mouth Daily.     • [DISCONTINUED] diclofenac (VOLTAREN) 75 MG EC tablet        No current facility-administered medications on file prior to encounter.        Past Medical History:   Diagnosis Date   • Anxiety    • Arthritis    • Hypertension    • Low back pain    • Peripheral neuropathy        SOCIAL HISTORY:  Counseled to avoid tobacco     REVIEW OF SYSTEMS:  No pertinent hematologic, infectious, or constitutional symptoms.  Other review of systems non-contributory     PHYSICAL EXAM:  BP (!) 146/101 (BP Location: Left arm, Patient Position: Sitting)   Pulse 62   Temp 36.5 °C (97.7 °F) (Oral)   Resp 16   SpO2 95%   Constitutional: Well-developed well-nourished no acute distress  General: Alert and oriented  Ophtho: EOMI  Airway: Mallampati 2  Cardiac:  Regular rate  Lungs:  Clear to auscultation  bilaterally   GI: soft, nontender  Cervical Spine: Noncontributory  Sacroiliac Joints: Noncontributory  Musc: Decreased range of motion and pain with forward flexion  Neuro: Straight leg raise test is positive on the right     DIAGNOSIS:  Post-Op Diagnosis Codes:  Post-Op Diagnosis Codes:     * Lumbar radiculopathy [M54.16]       PLAN:  -  Lumbar epidural steroid injection at the planned level of L3-L4 on the right side.  This will be a change from the L4-L5 level where he had the epidural last time. If pain control is acceptable after this injection, it was discussed with the patient that they may return for the subsequent injections if and when their pain returns.    - Risks were discussed with the patient, including but not limited to: failure of relief, worsening pain, tissue, nerve, blood vessel or spinal cord damage, post-dural-puncture headache, bleeding, epidural hematoma, infection, and epidural abscess. Benefits and alternatives were also discussed. No promises were made. The patient voiced understanding.  -  Physical therapy exercises at home should be considered, as tolerated, as prescribed by physical therapy or from the pain clinic handout (given to the patient).  Continuation of these exercises every day, or multiple times per week, even when the patient has good pain relief, was stressed to the patient as a preventative measure to decrease the frequency and severity of future pain episodes.  -  It is recommended that the patient use the least amount of opioid medication possible.     -  Heat and ice to the affected area as tolerated for pain control.  It was discussed that heating pads can cause burns.  -  Daily low impact exercise such as walking or water exercise was recommended to maintain overall health and aid in weight control.   -  Patient was counseled to abstain from tobacco products.  -  Follow up as needed for subsequent injections.  -My understanding is that this patient is a possible  surgical candidate in the future.  However, I will leave that between him and his spine surgeon.    Chaz Bliss M.D.  [facility], [facility]  Board Certified Pain Medicine Specialist by the American Board of Anesthesiology  Board Certified Anesthesiologist by the American Board of Anesthesiology     Much of this encounter note is an electronic transcription/translation of spoken language to printed text. The electronic translation of spoken language may permit erroneous, or at times, nonsensical words or phrases to be inadvertently transcribed. Although I have reviewed the note for such errors, some may still exist.

## 2020-01-03 NOTE — PROGRESS NOTES
Subjective   Patient ID: Philip Calloway is a 59 y.o. male is here today for follow-up. He was last seen 9/10/2019 for back pain that radiated into his right leg with numbness and tingling. He also had right side abdomen pain.  Today his symptoms are back pain that radiates into bilateral lower extremity's with weakness. He has had 2 Lumbar GABRIEL and 1 facet. He currently takes Mobic and Percocet.    History of Present Illness    This patient returns today.  He continues with pain in his back with radiation into his right leg.  His right-sided abdominal pain is gone.  Since he was here last his left leg is gotten worse.  He is now walking with a cane.    The following portions of the patient's history were reviewed and updated as appropriate: allergies, current medications, past family history, past medical history, past social history, past surgical history and problem list.    Review of Systems   Respiratory: Negative for chest tightness and shortness of breath.    Cardiovascular: Negative for chest pain.   Musculoskeletal: Positive for back pain.        Bilateral lower extremity pain   Neurological: Positive for weakness.   All other systems reviewed and are negative.      Objective   Physical Exam   Constitutional: He is oriented to person, place, and time. He appears well-developed and well-nourished.   Neurological: He is oriented to person, place, and time.     Neurologic Exam     Mental Status   Oriented to person, place, and time.       Assessment/Plan   Independent Review of Radiographic Studies:      I reviewed an MRI done on November 21 of this last year.  This shows similar changes to the myelogram that he had in September of last year.  I reviewed that test again as well.  This does show some stenosis at L3-4 and L4-5.  The post myelographic CT scan there is some lateral recess stenosis bilaterally at L3-4.  At L4-5 that shows some foraminal stenosis on the left primarily.  L5-S1 mostly looks okay some  disc bulging and a little foraminal stenosis.  He has had previous surgery on the left at L4-5    Medical Decision Making:      I told the patient and his wife about the imaging.  I think that if any surgery were done he would require a decompression at least at L3 L4-5 and may be at L5-S1 as well.  I told the patient about the risks, complications and expected outcome of the lumbar surgery.  I explained that there was an 80% chance of getting rid of the pain in the leg.  I explained that there would still be back pain after the surgery.  Initially this will be quite severe but will improve over time.  There is a 2 or 3% chance of infection, bleeding, CSF leak, damage to the nerve as a result of surgery, paralysis, as well as anesthetic risk.  There is a 10% chance of recurrent problems.  There is a 10% chance of nonunion or failure of the instrumentation.  We discussed the postoperative hospital and home course.  The patient does ask proceed.  I explained as well why he would need a fusion.  We will get him into see an orthopedic surgeon for a second opinion as well as for a fusion evaluation.    He will need to be scheduled for a: Lumbar 3 to lumbar 4 and lumbar 4 lumbar 5 laminectomy and neural lysis the fusion by orthopedics and possible lumbar 5 sacral 1    Philip was seen today for back pain and leg pain.    Diagnoses and all orders for this visit:    Lumbar radiculopathy  -     Ambulatory Referral to Orthopedic Surgery      Return for 2-3 week post op.

## 2020-01-06 ENCOUNTER — PREP FOR SURGERY (OUTPATIENT)
Dept: OTHER | Facility: HOSPITAL | Age: 60
End: 2020-01-06

## 2020-01-06 ENCOUNTER — OFFICE VISIT (OUTPATIENT)
Dept: NEUROSURGERY | Facility: CLINIC | Age: 60
End: 2020-01-06

## 2020-01-06 VITALS — HEART RATE: 79 BPM | DIASTOLIC BLOOD PRESSURE: 88 MMHG | SYSTOLIC BLOOD PRESSURE: 152 MMHG

## 2020-01-06 DIAGNOSIS — M54.16 LUMBAR RADICULOPATHY: Primary | ICD-10-CM

## 2020-01-06 PROCEDURE — 99213 OFFICE O/P EST LOW 20 MIN: CPT | Performed by: NEUROLOGICAL SURGERY

## 2020-01-06 RX ORDER — MELOXICAM 15 MG/1
15 TABLET ORAL DAILY
Status: ON HOLD | COMMUNITY
Start: 2019-12-04 | End: 2020-03-02

## 2020-01-06 RX ORDER — LORAZEPAM 1 MG/1
.5-1 TABLET ORAL 2 TIMES DAILY PRN
COMMUNITY
Start: 2019-11-14 | End: 2020-06-02

## 2020-01-06 RX ORDER — CEFAZOLIN SODIUM 2 G/100ML
2 INJECTION, SOLUTION INTRAVENOUS ONCE
Status: CANCELLED | OUTPATIENT
Start: 2020-02-26 | End: 2020-01-06

## 2020-01-14 ENCOUNTER — OFFICE VISIT (OUTPATIENT)
Dept: ORTHOPEDIC SURGERY | Facility: CLINIC | Age: 60
End: 2020-01-14

## 2020-01-14 VITALS — HEIGHT: 73 IN | BODY MASS INDEX: 34.46 KG/M2 | WEIGHT: 260 LBS | TEMPERATURE: 98.2 F

## 2020-01-14 DIAGNOSIS — M48.062 SPINAL STENOSIS OF LUMBAR REGION WITH NEUROGENIC CLAUDICATION: ICD-10-CM

## 2020-01-14 DIAGNOSIS — M43.16 SPONDYLOLISTHESIS OF LUMBAR REGION: Primary | ICD-10-CM

## 2020-01-14 PROCEDURE — 99204 OFFICE O/P NEW MOD 45 MIN: CPT | Performed by: ORTHOPAEDIC SURGERY

## 2020-01-14 NOTE — PROGRESS NOTES
New patient or new problem visit    Chief Complaint   Patient presents with   • Lumbar Spine - Pain, Establish Care       HPI: He is seen today complaining of low back pain which radiates into the legs bilaterally and is associated with weakness therein.  He also notes a burning aching pain in the legs which is constant.  Pain is moderate to severe and fails to improve with facet blocks and physical therapy although epidural injections did help significantly but temporarily.  He is seen today at request of Dr. Miranda.  Currently he takes Percocet and meloxicam for this.    PFSH: See chart- reviewed    Review of Systems   Constitutional: Positive for activity change and fatigue. Negative for chills, fever and unexpected weight change.   HENT: Positive for sneezing and tinnitus. Negative for trouble swallowing and voice change.    Eyes: Negative for visual disturbance.   Respiratory: Positive for shortness of breath. Negative for cough.    Cardiovascular: Positive for palpitations and leg swelling (renita legs). Negative for chest pain.   Gastrointestinal: Positive for rectal pain. Negative for abdominal pain, nausea and vomiting.   Endocrine: Negative for cold intolerance and heat intolerance.   Genitourinary: Negative for difficulty urinating, frequency and urgency.   Musculoskeletal: Positive for arthralgias, back pain and myalgias.   Skin: Negative for rash and wound.   Allergic/Immunologic: Negative for immunocompromised state.   Neurological: Positive for weakness and numbness (right leg).   Hematological: Does not bruise/bleed easily.   Psychiatric/Behavioral: Positive for dysphoric mood. The patient is nervous/anxious.        PE: Constitutional: Vital signs above-noted.  Awake, alert and oriented    Psychiatric: Affect and insight do not appear grossly disturbed.    Pulmonary: Breathing is unlabored, color is good.    Skin: Warm, dry and normal turgor    Cardiac: Pedal pulses intact.  No edema.    Eyesight and  hearing appear adequate for examination purposes      Musculoskeletal:  There is some tenderness to percussion and palpation of the spine. Motion appears undisturbed.  Posture is unremarkable to coronal and sagittal inspection.    The skin about the area is intact.  There is no palpable or visible deformity.  There is no local spasm.       Neurologic:   Reflexes are 2+ and symmetrical in the patellae and absent in the Achilles.   Motor function is undisturbed in quadriceps, EHL, and gastrocnemius   sensation appears symmetrically intact to light touch except for an area in the lateral right leg which is numb.  In the bilateral lower extremities there is no evidence of atrophy.   Clonus is absent..  Gait appears undisturbed.  SLR test negative      MEDICAL DECISION MAKING    XRAY: Plain film x-rays lumbar spine show loss of lordosis, L3-4 chondrocalcinosis, and multilevel moderate disc degeneration.  As these were taken after the myelographic contrast injection this could represent L3-4 intradiscal contrast agent.  For purposes of surgical planning it does not really matter which.  At L4-5 there is a hint of anterolisthesis.  On myelogram and CT scan there appears to be less than unstable motion at L4-5 on flexion-extension films but no obvious neurologic compression.  Post myelogram CT scan demonstrates mild stenosis at L2-3 moderate changes at 3 4 and 4 5 including some foraminal narrowing from disc protrusion on the right side at L4-5.    Other: I reviewed Dr. Clark's notes and impression with which I agree.  He is also concerned about L5-S1.    Impression: He has loss of lordosis and multilevel degenerative changes but more leg than back pain.  There is L4-5 spondylolisthesis.  I would be inclined to confine the fusion to any decompressed levels which I believe will be L3-4 and L4-5.  If at L5-S1 is significantly decompressed we will fuse it as well.    Plan: L3-5 fusion with instrumentation and laminectomy by  Dr. Esquivel.  I discussed the risks and benefits of posterior spinal fusion, including where applicable, laminectomy.  Risks include adverse anesthetic events such as death, stroke and myocardial infarction.  More specific surgical risks include infection, nonunion, hardware failure, spinal fluid leakage, nerve root injury or paralysis, visceral or vascular injury, persistent pain, deep venous thrombosis, and pulmonary embolism among others. There is a 70 to 90 % chance of success.   Alternatives have been discussed.  After careful consideration the patient wishes to proceed with surgery.

## 2020-01-16 ENCOUNTER — TELEPHONE (OUTPATIENT)
Dept: NEUROSURGERY | Facility: CLINIC | Age: 60
End: 2020-01-16

## 2020-01-16 RX ORDER — CEFAZOLIN SODIUM 2 G/100ML
2 INJECTION, SOLUTION INTRAVENOUS ONCE
Status: CANCELLED | OUTPATIENT
Start: 2020-03-02 | End: 2020-01-16

## 2020-01-29 PROBLEM — M43.16 SPONDYLOLISTHESIS OF LUMBAR REGION: Status: ACTIVE | Noted: 2020-01-29

## 2020-01-29 PROBLEM — M48.062 SPINAL STENOSIS OF LUMBAR REGION WITH NEUROGENIC CLAUDICATION: Status: ACTIVE | Noted: 2020-01-29

## 2020-01-30 DIAGNOSIS — M43.16 SPONDYLOLISTHESIS OF LUMBAR REGION: Primary | ICD-10-CM

## 2020-01-30 DIAGNOSIS — M48.062 SPINAL STENOSIS OF LUMBAR REGION WITH NEUROGENIC CLAUDICATION: ICD-10-CM

## 2020-02-07 ENCOUNTER — TELEPHONE (OUTPATIENT)
Dept: NEUROSURGERY | Facility: CLINIC | Age: 60
End: 2020-02-07

## 2020-02-07 NOTE — TELEPHONE ENCOUNTER
I have left 2 messaged for pt to call to discuss surgery date.      ----- Message from Kerrie Stevens MA sent at 1/28/2020  1:50 PM EST -----  Please call patient to schedule surgery

## 2020-02-11 NOTE — TELEPHONE ENCOUNTER
"I spoke with pt regarding his surgery date. I also gave him a \"pre-op\" appt with Dr. Esquivel for 2/13.  "

## 2020-02-12 NOTE — PROGRESS NOTES
Subjective   Patient ID: Philip Calloway is a 59 y.o. male is here today for follow-up. Mr. Calloway was last seen 1/6/2020 for back pain that radiated into bilateral lower extremity's with weakness.  Today his symptoms are unchanged from his previous visit.     History of Present Illness     This patient continues with pain in his back with radiation primarily into his right leg.  His left leg also hurts.  He is having a lot of trouble walking.  He has no difficulty with bowel bladder control or other associated symptoms    The following portions of the patient's history were reviewed and updated as appropriate: allergies, current medications, past family history, past medical history, past social history, past surgical history and problem list.    Review of Systems   Constitutional: Positive for activity change.   Respiratory: Negative for chest tightness and shortness of breath.    Cardiovascular: Positive for leg swelling. Negative for chest pain.   Musculoskeletal: Positive for back pain and gait problem.        Bilateral leg pain   Neurological: Positive for weakness.   All other systems reviewed and are negative.      Objective   Physical Exam   Constitutional: He is oriented to person, place, and time. He appears well-developed and well-nourished.   Eyes: Pupils are equal, round, and reactive to light. EOM are normal.   Neurological: He is oriented to person, place, and time. He has a normal Finger-Nose-Finger Test and a normal Heel to Shin Test. Gait normal.   Reflex Scores:       Tricep reflexes are 2+ on the right side and 2+ on the left side.       Bicep reflexes are 2+ on the right side and 2+ on the left side.       Brachioradialis reflexes are 2+ on the right side and 2+ on the left side.       Patellar reflexes are 2+ on the right side and 2+ on the left side.       Achilles reflexes are 2+ on the right side and 2+ on the left side.  Psychiatric: His speech is normal.     Neurologic Exam     Mental  Status   Oriented to person, place, and time.   Registration of memory: Good recent and remote memory.   Attention: normal. Concentration: normal.   Speech: speech is normal   Level of consciousness: alert  Knowledge: consistent with education.     Cranial Nerves     CN II   Visual fields full to confrontation.   Visual acuity: normal    CN III, IV, VI   Pupils are equal, round, and reactive to light.  Extraocular motions are normal.     CN V   Facial sensation intact.   Right corneal reflex: normal  Left corneal reflex: normal    CN VII   Facial expression full, symmetric.   Right facial weakness: none  Left facial weakness: none    CN VIII   Hearing: intact    CN IX, X   Palate: symmetric    CN XI   Right sternocleidomastoid strength: normal  Left sternocleidomastoid strength: normal    CN XII   Tongue: not atrophic  Tongue deviation: none    Motor Exam   Muscle bulk: normal  Right arm tone: normal  Left arm tone: normal  Right leg tone: normal  Left leg tone: normal    Strength   Strength 5/5 except as noted.     Sensory Exam   Light touch normal.     Gait, Coordination, and Reflexes     Gait  Gait: normal    Coordination   Finger to nose coordination: normal  Heel to shin coordination: normal    Reflexes   Right brachioradialis: 2+  Left brachioradialis: 2+  Right biceps: 2+  Left biceps: 2+  Right triceps: 2+  Left triceps: 2+  Right patellar: 2+  Left patellar: 2+  Right achilles: 2+  Left achilles: 2+  Right : 2+  Left : 2+      Assessment/Plan   Independent Review of Radiographic Studies:      I reviewed an MRI done on 21 November of last year.  This looks similar to his myelogram that was done in 2018.  This shows severe stenosis at L3-4 and L4-5 but the other levels mostly look okay.  He has had previous surgery on the left and L4-5.    Medical Decision Making:      I told the patient it would be reasonable to proceed with surgery at this point.  I told him that he has some swelling in his ankles  and we should really get cardiac clearance prior to surgery but I did tell him if he wanted to check in with his PCP just to be sure that he would not be willing to clear him if he is then we can hold off on cardiac clearance.  I told the patient about the risks, complications and expected outcome of the lumbar surgery.  I explained that there was an 80% chance of getting rid of the pain in the leg.  I explained that there would still be back pain after the surgery.  Initially this will be quite severe but will improve over time.  There is a 2 or 3% chance of infection, bleeding, CSF leak, damage to the nerve as a result of surgery, paralysis, as well as anesthetic risk.  There is a 10% chance of recurrent problems.  There is a 10% chance of nonunion or failure of the instrumentation.  We discussed the postoperative hospital and home course.  The patient does ask proceed.    He will need to be scheduled for a:Lumbar 3 to lumbar 4 and lumbar 4 lumbar 5 laminectomy and neural lysis the fusion by orthopedics    Philip was seen today for back pain.    Diagnoses and all orders for this visit:    Spinal stenosis of lumbar region with neurogenic claudication  -     Ambulatory Referral to Cardiology    Lumbar radiculopathy    Spondylolisthesis of lumbar region      Return for 2-3 week post op.

## 2020-02-13 ENCOUNTER — OFFICE VISIT (OUTPATIENT)
Dept: NEUROSURGERY | Facility: CLINIC | Age: 60
End: 2020-02-13

## 2020-02-13 VITALS — DIASTOLIC BLOOD PRESSURE: 93 MMHG | HEART RATE: 70 BPM | SYSTOLIC BLOOD PRESSURE: 155 MMHG

## 2020-02-13 DIAGNOSIS — M48.062 SPINAL STENOSIS OF LUMBAR REGION WITH NEUROGENIC CLAUDICATION: Primary | ICD-10-CM

## 2020-02-13 DIAGNOSIS — M43.16 SPONDYLOLISTHESIS OF LUMBAR REGION: ICD-10-CM

## 2020-02-13 DIAGNOSIS — M54.16 LUMBAR RADICULOPATHY: ICD-10-CM

## 2020-02-13 PROCEDURE — 99213 OFFICE O/P EST LOW 20 MIN: CPT | Performed by: NEUROLOGICAL SURGERY

## 2020-02-18 ENCOUNTER — OFFICE VISIT (OUTPATIENT)
Dept: CARDIOLOGY | Facility: CLINIC | Age: 60
End: 2020-02-18

## 2020-02-18 VITALS
SYSTOLIC BLOOD PRESSURE: 148 MMHG | HEART RATE: 63 BPM | DIASTOLIC BLOOD PRESSURE: 98 MMHG | WEIGHT: 263 LBS | BODY MASS INDEX: 34.85 KG/M2 | HEIGHT: 73 IN

## 2020-02-18 DIAGNOSIS — Z01.810 PREOPERATIVE CARDIOVASCULAR EXAMINATION: Primary | ICD-10-CM

## 2020-02-18 PROCEDURE — 93000 ELECTROCARDIOGRAM COMPLETE: CPT | Performed by: INTERNAL MEDICINE

## 2020-02-18 PROCEDURE — 99204 OFFICE O/P NEW MOD 45 MIN: CPT | Performed by: INTERNAL MEDICINE

## 2020-02-18 NOTE — PROGRESS NOTES
Subjective:     Encounter Date:02/18/2020      Patient ID: Philip Calloway is a 59 y.o. male.    Chief Complaint: Preoperative evaluation    HPI:   This is a 59-year-old man who presents for preoperative evaluation prior to spine surgery.  He was recently evaluated by Dr. Esquivel in neurosurgery for chronic low back pain.He has severe spinal stenosis at L3-4 and L4-5, and is scheduled to have laminectomy and fusion in early March.  During the exam, Dr. Miranda noted lower extremity edema and referred him for preoperative evaluation with me.    The patient today states that for over 6 months he has been relatively incapacitated.  He does not do any regular physical activity.  He cannot walk for more than 5 minutes on flat ground before having to stop due to low back pain and dyspnea/fatigue.  He does not climb stairs.  He does minimal housework.  He is gained about 10 pounds since August when he was hospitalized with back pain. He has no orthopnea or PND.  He notes that he is had lower extremity edema for some time which is worsened over the last few months.  There is no abdominal distention or scrotal edema.    About 10 years ago he had a nuclear stress test to evaluate palpitations.  Apparently that was normal.      He has no cardiac history outside of hypertension.  He was a 1 pack/day smoker and quit in 2019.  He had a grandfather with heart disease but no first-degree relatives.  The following portions of the patient's history were reviewed and updated as appropriate: allergies, current medications, past family history, past medical history, past social history, past surgical history and problem list.     REVIEW OF SYSTEMS:   All systems reviewed.  Pertinent positives identified in HPI.  All other systems are negative.    Past Medical History:   Diagnosis Date   • Anxiety    • Arthritis    • Hypertension    • Low back pain    • Peripheral neuropathy    • Spinal stenosis of lumbar region with neurogenic  claudication        Family History   Problem Relation Age of Onset   • Diabetes Mother    • Cancer Father        Social History     Socioeconomic History   • Marital status:      Spouse name: Not on file   • Number of children: 8   • Years of education: Not on file   • Highest education level: High school graduate   Occupational History   • Occupation: manager/sales   Social Needs   • Financial resource strain: Patient refused   • Food insecurity:     Worry: Patient refused     Inability: Patient refused   • Transportation needs:     Medical: Patient refused     Non-medical: Patient refused   Tobacco Use   • Smoking status: Former Smoker     Packs/day: 1.00     Years: 4.00     Pack years: 4.00     Types: Cigarettes     Last attempt to quit: 2019     Years since quittin.5   • Smokeless tobacco: Never Used   • Tobacco comment: daily caffeine  use   Substance and Sexual Activity   • Alcohol use: Yes     Comment: socially   • Drug use: No   • Sexual activity: Defer       No Known Allergies    Past Surgical History:   Procedure Laterality Date   • BACK SURGERY  1998   • EPIDURAL BLOCK     • HERNIA REPAIR  2016   • SPINE SURGERY           ECG 12 Lead  Date/Time: 2020 2:58 PM  Performed by: Linda Jarquin MD  Authorized by: Linda Jarquin MD   Comparison: not compared with previous ECG   Rhythm: sinus rhythm  Rate: normal  Conduction: conduction normal  ST Segments: ST segments normal  T Waves: T waves normal  QRS axis: normal  Other: no other findings    Clinical impression: normal ECG               Objective:         PHYSICAL EXAM:  GEN: VSS, no distress,   Eyes: normal sclera, normal lids and lashes  HENT: moist mucus membranes,   Respiratory: CTAB, no rales or wheezes  CV: RRR, no murmurs, , +2 DP and 2+ carotid pulses b/l  GI: NABS, soft,  Nontender, nondistended  MSK: +2 edema to the midshin bilaterally, no scoliosis or kyphosis  Skin: no rash, warm, dry  Heme/Lymph: no bruising or  bleeding  Psych: organized thought, normal behavior and affect  Neuro: Cranial nerves grossly intact, Alert and Oriented x 3.         Assessment:          Diagnosis Plan   1. Preoperative cardiovascular examination  Adult Transthoracic Echo Complete W/ Cont if Necessary Per Protocol    Stress Test With Myocardial Perfusion          Plan:       1.  Preoperative evaluation: The patient is unable to exercise due to his low back pain.  I am therefore unable to assess his exercise capacity.  For that reason, he will need to complete a stress test prior to this intermediate risk surgery.  He has a number of cardiovascular risk factors including heavy prior smoking and hypertension, as well as obesity.    2.  Lower extremity edema: He has +2 bilateral edema to the midshin.  It is unlikely this is related to heart failure as he is lung sounds are clear and he has no JVD.  Additionally, there are no other symptoms of heart failure.  We will get an echocardiogram to further evaluate.    Dr. Esquivel, thank you very much for referring this kind patient to me. Please call me with any questions or concerns. I will see the patient again in the office as needed based on the results of his testing.          Linda Jarquin MD  02/18/20  [facility]    Outpatient Encounter Medications as of 2/18/2020   Medication Sig Dispense Refill   • LORazepam (ATIVAN) 1 MG tablet      • meloxicam (MOBIC) 15 MG tablet      • oxyCODONE-acetaminophen (PERCOCET)  MG per tablet Take 1 tablet by mouth Every 8 (Eight) Hours As Needed for Moderate Pain .     • valsartan (DIOVAN) 320 MG tablet Take 320 mg by mouth Daily.       No facility-administered encounter medications on file as of 2/18/2020.

## 2020-02-20 ENCOUNTER — HOSPITAL ENCOUNTER (OUTPATIENT)
Dept: CARDIOLOGY | Facility: HOSPITAL | Age: 60
Discharge: HOME | End: 2020-02-20
Admitting: INTERNAL MEDICINE

## 2020-02-20 ENCOUNTER — TELEPHONE (OUTPATIENT)
Dept: CARDIOLOGY | Facility: CLINIC | Age: 60
End: 2020-02-20

## 2020-02-20 ENCOUNTER — HOSPITAL ENCOUNTER (OUTPATIENT)
Dept: CARDIOLOGY | Facility: HOSPITAL | Age: 60
Discharge: HOME | End: 2020-02-20

## 2020-02-20 ENCOUNTER — APPOINTMENT (OUTPATIENT)
Dept: PREADMISSION TESTING | Facility: HOSPITAL | Age: 60
End: 2020-02-20

## 2020-02-20 VITALS
SYSTOLIC BLOOD PRESSURE: 150 MMHG | HEART RATE: 73 BPM | DIASTOLIC BLOOD PRESSURE: 100 MMHG | HEIGHT: 73 IN | BODY MASS INDEX: 34.85 KG/M2 | WEIGHT: 263 LBS

## 2020-02-20 VITALS — WEIGHT: 262.35 LBS | BODY MASS INDEX: 34.77 KG/M2 | HEIGHT: 73 IN

## 2020-02-20 DIAGNOSIS — Z01.810 PREOPERATIVE CARDIOVASCULAR EXAMINATION: ICD-10-CM

## 2020-02-20 LAB
AORTIC ARCH: 2.5 CM
AORTIC ROOT ANNULUS: 2.5 CM
ASCENDING AORTA: 3.8 CM
BH CV ECHO MEAS - ACS: 2.3 CM
BH CV ECHO MEAS - AO MAX PG (FULL): 2.8 MMHG
BH CV ECHO MEAS - AO MAX PG: 6 MMHG
BH CV ECHO MEAS - AO MEAN PG (FULL): 1.5 MMHG
BH CV ECHO MEAS - AO MEAN PG: 3.5 MMHG
BH CV ECHO MEAS - AO V2 MAX: 122.6 CM/SEC
BH CV ECHO MEAS - AO V2 MEAN: 89.7 CM/SEC
BH CV ECHO MEAS - AO V2 VTI: 24.4 CM
BH CV ECHO MEAS - ASC AORTA: 3.8 CM
BH CV ECHO MEAS - AVA(I,A): 2.5 CM^2
BH CV ECHO MEAS - AVA(I,D): 2.5 CM^2
BH CV ECHO MEAS - AVA(V,A): 2.1 CM^2
BH CV ECHO MEAS - AVA(V,D): 2.1 CM^2
BH CV ECHO MEAS - BSA(HAYCOCK): 2.5 M^2
BH CV ECHO MEAS - BSA: 2.4 M^2
BH CV ECHO MEAS - BZI_BMI: 34.7 KILOGRAMS/M^2
BH CV ECHO MEAS - BZI_METRIC_HEIGHT: 185.4 CM
BH CV ECHO MEAS - BZI_METRIC_WEIGHT: 119.3 KG
BH CV ECHO MEAS - EDV(MOD-SP2): 81 ML
BH CV ECHO MEAS - EDV(MOD-SP4): 109 ML
BH CV ECHO MEAS - EDV(TEICH): 91.8 ML
BH CV ECHO MEAS - EF(CUBED): 70.7 %
BH CV ECHO MEAS - EF(MOD-BP): 61 %
BH CV ECHO MEAS - EF(MOD-SP2): 60.5 %
BH CV ECHO MEAS - EF(MOD-SP4): 60.6 %
BH CV ECHO MEAS - EF(TEICH): 62.5 %
BH CV ECHO MEAS - ESV(MOD-SP2): 32 ML
BH CV ECHO MEAS - ESV(MOD-SP4): 43 ML
BH CV ECHO MEAS - ESV(TEICH): 34.4 ML
BH CV ECHO MEAS - FS: 33.6 %
BH CV ECHO MEAS - IVS/LVPW: 0.95
BH CV ECHO MEAS - IVSD: 1.2 CM
BH CV ECHO MEAS - LAT PEAK E' VEL: 9 CM/SEC
BH CV ECHO MEAS - LV DIASTOLIC VOL/BSA (35-75): 45.1 ML/M^2
BH CV ECHO MEAS - LV MASS(C)D: 200 GRAMS
BH CV ECHO MEAS - LV MASS(C)DI: 82.7 GRAMS/M^2
BH CV ECHO MEAS - LV MAX PG: 3.2 MMHG
BH CV ECHO MEAS - LV MEAN PG: 2 MMHG
BH CV ECHO MEAS - LV SYSTOLIC VOL/BSA (12-30): 17.8 ML/M^2
BH CV ECHO MEAS - LV V1 MAX: 89.1 CM/SEC
BH CV ECHO MEAS - LV V1 MEAN: 68.8 CM/SEC
BH CV ECHO MEAS - LV V1 VTI: 21.8 CM
BH CV ECHO MEAS - LVIDD: 4.5 CM
BH CV ECHO MEAS - LVIDS: 3 CM
BH CV ECHO MEAS - LVLD AP2: 7.3 CM
BH CV ECHO MEAS - LVLD AP4: 8.7 CM
BH CV ECHO MEAS - LVLS AP2: 6.2 CM
BH CV ECHO MEAS - LVLS AP4: 7.6 CM
BH CV ECHO MEAS - LVOT AREA (M): 2.8 CM^2
BH CV ECHO MEAS - LVOT AREA: 2.8 CM^2
BH CV ECHO MEAS - LVOT DIAM: 1.9 CM
BH CV ECHO MEAS - LVPWD: 1.2 CM
BH CV ECHO MEAS - MED PEAK E' VEL: 7 CM/SEC
BH CV ECHO MEAS - MV A DUR: 0.11 SEC
BH CV ECHO MEAS - MV A MAX VEL: 62.1 CM/SEC
BH CV ECHO MEAS - MV DEC SLOPE: 172.2 CM/SEC^2
BH CV ECHO MEAS - MV DEC TIME: 0.29 SEC
BH CV ECHO MEAS - MV E MAX VEL: 49.7 CM/SEC
BH CV ECHO MEAS - MV E/A: 0.8
BH CV ECHO MEAS - MV MAX PG: 2.5 MMHG
BH CV ECHO MEAS - MV MEAN PG: 1 MMHG
BH CV ECHO MEAS - MV P1/2T MAX VEL: 51 CM/SEC
BH CV ECHO MEAS - MV P1/2T: 86.7 MSEC
BH CV ECHO MEAS - MV V2 MAX: 78.3 CM/SEC
BH CV ECHO MEAS - MV V2 MEAN: 47.1 CM/SEC
BH CV ECHO MEAS - MV V2 VTI: 26.3 CM
BH CV ECHO MEAS - MVA P1/2T LCG: 4.3 CM^2
BH CV ECHO MEAS - MVA(P1/2T): 2.5 CM^2
BH CV ECHO MEAS - MVA(VTI): 2.4 CM^2
BH CV ECHO MEAS - PA ACC TIME: 0.07 SEC
BH CV ECHO MEAS - PA MAX PG (FULL): 1.8 MMHG
BH CV ECHO MEAS - PA MAX PG: 3.6 MMHG
BH CV ECHO MEAS - PA PR(ACCEL): 45.7 MMHG
BH CV ECHO MEAS - PA V2 MAX: 94.7 CM/SEC
BH CV ECHO MEAS - PULM A REVS DUR: 0.11 SEC
BH CV ECHO MEAS - PULM A REVS VEL: 43 CM/SEC
BH CV ECHO MEAS - PULM DIAS VEL: 50.5 CM/SEC
BH CV ECHO MEAS - PULM S/D: 1.2
BH CV ECHO MEAS - PULM SYS VEL: 58.4 CM/SEC
BH CV ECHO MEAS - PVA(V,A): 2.6 CM^2
BH CV ECHO MEAS - PVA(V,D): 2.6 CM^2
BH CV ECHO MEAS - QP/QS: 0.53
BH CV ECHO MEAS - RAP SYSTOLE: 3 MMHG
BH CV ECHO MEAS - RV MAX PG: 1.7 MMHG
BH CV ECHO MEAS - RV MEAN PG: 0.67 MMHG
BH CV ECHO MEAS - RV V1 MAX: 66 CM/SEC
BH CV ECHO MEAS - RV V1 MEAN: 37.3 CM/SEC
BH CV ECHO MEAS - RV V1 VTI: 8.9 CM
BH CV ECHO MEAS - RVOT AREA: 3.7 CM^2
BH CV ECHO MEAS - RVOT DIAM: 2.2 CM
BH CV ECHO MEAS - RVSP: 23 MMHG
BH CV ECHO MEAS - SI(CUBED): 26.4 ML/M^2
BH CV ECHO MEAS - SI(LVOT): 25.6 ML/M^2
BH CV ECHO MEAS - SI(MOD-SP2): 20.3 ML/M^2
BH CV ECHO MEAS - SI(MOD-SP4): 27.3 ML/M^2
BH CV ECHO MEAS - SI(TEICH): 23.7 ML/M^2
BH CV ECHO MEAS - SUP REN AO DIAM: 2.3 CM
BH CV ECHO MEAS - SV(CUBED): 63.8 ML
BH CV ECHO MEAS - SV(LVOT): 62 ML
BH CV ECHO MEAS - SV(MOD-SP2): 49 ML
BH CV ECHO MEAS - SV(MOD-SP4): 66 ML
BH CV ECHO MEAS - SV(RVOT): 33.1 ML
BH CV ECHO MEAS - SV(TEICH): 57.4 ML
BH CV ECHO MEAS - TAPSE (>1.6): 2.7 CM2
BH CV ECHO MEAS - TR MAX VEL: 222.6 CM/SEC
BH CV ECHO MEASUREMENTS AVERAGE E/E' RATIO: 6.21
BH CV NUCLEAR PRIOR STUDY: 2
BH CV STRESS BP STAGE 1: NORMAL
BH CV STRESS COMMENTS STAGE 1: NORMAL
BH CV STRESS DOSE REGADENOSON STAGE 1: 0.4
BH CV STRESS DURATION MIN STAGE 1: 0
BH CV STRESS DURATION SEC STAGE 1: 10
BH CV STRESS HR STAGE 1: 98
BH CV STRESS PROTOCOL 1: NORMAL
BH CV STRESS RECOVERY BP: NORMAL MMHG
BH CV STRESS RECOVERY HR: 78 BPM
BH CV STRESS STAGE 1: 1
BH CV XLRA - RV BASE: 2.8 CM
BH CV XLRA - RV LENGTH: 5.6 CM
BH CV XLRA - RV MID: 2 CM
BH CV XLRA - TDI S': 11 CM/SEC
LEFT ATRIUM VOLUME INDEX: 15 ML/M2
LV EF 2D ECHO EST: 61 %
LV EF NUC BP: 65 %
MAXIMAL PREDICTED HEART RATE: 163 BPM
PERCENT MAX PREDICTED HR: 60.12 %
SINUS: 3.8 CM
STJ: 3.3 CM
STRESS BASELINE BP: NORMAL MMHG
STRESS BASELINE HR: 70 BPM
STRESS PERCENT HR: 71 %
STRESS POST EXERCISE DUR SEC: 10 SEC
STRESS POST PEAK BP: NORMAL MMHG
STRESS POST PEAK HR: 98 BPM
STRESS TARGET HR: 139 BPM

## 2020-02-20 PROCEDURE — 78492 MYOCRD IMG PET MLT RST&STRS: CPT

## 2020-02-20 PROCEDURE — 93306 TTE W/DOPPLER COMPLETE: CPT

## 2020-02-20 PROCEDURE — 78492 MYOCRD IMG PET MLT RST&STRS: CPT | Performed by: INTERNAL MEDICINE

## 2020-02-20 PROCEDURE — 93016 CV STRESS TEST SUPVJ ONLY: CPT | Performed by: INTERNAL MEDICINE

## 2020-02-20 PROCEDURE — 25010000002 REGADENOSON 0.4 MG/5ML SOLUTION: Performed by: INTERNAL MEDICINE

## 2020-02-20 PROCEDURE — 93306 TTE W/DOPPLER COMPLETE: CPT | Performed by: INTERNAL MEDICINE

## 2020-02-20 PROCEDURE — A9555 RB82 RUBIDIUM: HCPCS | Performed by: INTERNAL MEDICINE

## 2020-02-20 PROCEDURE — 93017 CV STRESS TEST TRACING ONLY: CPT

## 2020-02-20 PROCEDURE — 93018 CV STRESS TEST I&R ONLY: CPT | Performed by: INTERNAL MEDICINE

## 2020-02-20 PROCEDURE — 0 RUBIDIUM CHLORIDE: Performed by: INTERNAL MEDICINE

## 2020-02-20 RX ADMIN — RUBIDIUM CHLORIDE RB-82 1 DOSE: 150 INJECTION, SOLUTION INTRAVENOUS at 11:10

## 2020-02-20 RX ADMIN — REGADENOSON 0.4 MG: 0.08 INJECTION, SOLUTION INTRAVENOUS at 11:10

## 2020-02-20 RX ADMIN — RUBIDIUM CHLORIDE RB-82 1 DOSE: 150 INJECTION, SOLUTION INTRAVENOUS at 11:00

## 2020-02-20 NOTE — PROGRESS NOTES
Please call normal results to patient and send clearance to Dr. Esquivel in neurosurgery and Dr Bourne in orthopedics

## 2020-02-20 NOTE — TELEPHONE ENCOUNTER
Pt notified of normal results and voiced understanding.he had no further questions at this time.      clearance letter faxed to  at [phone] and  at [phone]

## 2020-02-20 NOTE — TELEPHONE ENCOUNTER
----- Message from Linda Jarquin MD sent at 2/20/2020  4:10 PM EST -----  Please call normal results to patient and send clearance to Dr. Esquivel in neurosurgery and Dr Bourne in orthopedics

## 2020-02-24 ENCOUNTER — PREP FOR SURGERY (OUTPATIENT)
Dept: OTHER | Facility: HOSPITAL | Age: 60
End: 2020-02-24

## 2020-02-26 ENCOUNTER — APPOINTMENT (OUTPATIENT)
Dept: PREADMISSION TESTING | Facility: HOSPITAL | Age: 60
End: 2020-02-26

## 2020-02-26 ENCOUNTER — PREP FOR SURGERY (OUTPATIENT)
Dept: OTHER | Facility: HOSPITAL | Age: 60
End: 2020-02-26

## 2020-02-26 VITALS
RESPIRATION RATE: 16 BRPM | OXYGEN SATURATION: 97 % | TEMPERATURE: 98 F | DIASTOLIC BLOOD PRESSURE: 91 MMHG | WEIGHT: 264.4 LBS | HEIGHT: 72 IN | HEART RATE: 65 BPM | BODY MASS INDEX: 35.81 KG/M2 | SYSTOLIC BLOOD PRESSURE: 156 MMHG

## 2020-02-26 DIAGNOSIS — M48.062 SPINAL STENOSIS OF LUMBAR REGION WITH NEUROGENIC CLAUDICATION: Primary | ICD-10-CM

## 2020-02-26 LAB
ANION GAP SERPL CALCULATED.3IONS-SCNC: 9.8 MMOL/L (ref 5–15)
BUN BLD-MCNC: 10 MG/DL (ref 6–20)
BUN/CREAT SERPL: 15.2 (ref 7–25)
CALCIUM SPEC-SCNC: 9.1 MG/DL (ref 8.6–10.5)
CHLORIDE SERPL-SCNC: 99 MMOL/L (ref 98–107)
CO2 SERPL-SCNC: 27.2 MMOL/L (ref 22–29)
CREAT BLD-MCNC: 0.66 MG/DL (ref 0.76–1.27)
DEPRECATED RDW RBC AUTO: 39.8 FL (ref 37–54)
ERYTHROCYTE [DISTWIDTH] IN BLOOD BY AUTOMATED COUNT: 12.8 % (ref 12.3–15.4)
GFR SERPL CREATININE-BSD FRML MDRD: 124 ML/MIN/1.73
GLUCOSE BLD-MCNC: 87 MG/DL (ref 65–99)
HCT VFR BLD AUTO: 42.3 % (ref 37.5–51)
HGB BLD-MCNC: 14.3 G/DL (ref 13–17.7)
MCH RBC QN AUTO: 29.1 PG (ref 26.6–33)
MCHC RBC AUTO-ENTMCNC: 33.8 G/DL (ref 31.5–35.7)
MCV RBC AUTO: 86 FL (ref 79–97)
PLATELET # BLD AUTO: 244 10*3/MM3 (ref 140–450)
PMV BLD AUTO: 9.6 FL (ref 6–12)
POTASSIUM BLD-SCNC: 4.4 MMOL/L (ref 3.5–5.2)
RBC # BLD AUTO: 4.92 10*6/MM3 (ref 4.14–5.8)
SODIUM BLD-SCNC: 136 MMOL/L (ref 136–145)
WBC NRBC COR # BLD: 6.58 10*3/MM3 (ref 3.4–10.8)

## 2020-02-26 PROCEDURE — 80048 BASIC METABOLIC PNL TOTAL CA: CPT | Performed by: NEUROLOGICAL SURGERY

## 2020-02-26 PROCEDURE — 85027 COMPLETE CBC AUTOMATED: CPT | Performed by: NEUROLOGICAL SURGERY

## 2020-02-26 PROCEDURE — 36415 COLL VENOUS BLD VENIPUNCTURE: CPT

## 2020-02-26 RX ORDER — CEFAZOLIN SODIUM 2 G/100ML
2 INJECTION, SOLUTION INTRAVENOUS ONCE
Status: CANCELLED | OUTPATIENT
Start: 2020-02-26 | End: 2020-02-26

## 2020-03-02 ENCOUNTER — ANESTHESIA (OUTPATIENT)
Dept: PERIOP | Facility: HOSPITAL | Age: 60
End: 2020-03-02

## 2020-03-02 ENCOUNTER — HOSPITAL ENCOUNTER (OUTPATIENT)
Facility: HOSPITAL | Age: 60
Discharge: HOME | End: 2020-03-05
Attending: ORTHOPAEDIC SURGERY | Admitting: ORTHOPAEDIC SURGERY

## 2020-03-02 ENCOUNTER — APPOINTMENT (OUTPATIENT)
Dept: GENERAL RADIOLOGY | Facility: HOSPITAL | Age: 60
End: 2020-03-02

## 2020-03-02 ENCOUNTER — ANESTHESIA EVENT (OUTPATIENT)
Dept: PERIOP | Facility: HOSPITAL | Age: 60
End: 2020-03-02

## 2020-03-02 DIAGNOSIS — M43.16 SPONDYLOLISTHESIS OF LUMBAR REGION: ICD-10-CM

## 2020-03-02 DIAGNOSIS — M54.16 LUMBAR RADICULOPATHY: Primary | ICD-10-CM

## 2020-03-02 DIAGNOSIS — M48.062 SPINAL STENOSIS OF LUMBAR REGION WITH NEUROGENIC CLAUDICATION: ICD-10-CM

## 2020-03-02 LAB
ABO GROUP BLD: NORMAL
BLD GP AB SCN SERPL QL: NEGATIVE
GLUCOSE BLDC GLUCOMTR-MCNC: 178 MG/DL (ref 70–130)
HCT VFR BLD AUTO: 37 % (ref 37.5–51)
HGB BLD-MCNC: 12.5 G/DL (ref 13–17.7)
RH BLD: POSITIVE
T&S EXPIRATION DATE: NORMAL

## 2020-03-02 PROCEDURE — C1713 ANCHOR/SCREW BN/BN,TIS/BN: HCPCS | Performed by: ORTHOPAEDIC SURGERY

## 2020-03-02 PROCEDURE — 25010000002 ONDANSETRON PER 1 MG: Performed by: NURSE ANESTHETIST, CERTIFIED REGISTERED

## 2020-03-02 PROCEDURE — 22614 ARTHRD PST TQ 1NTRSPC EA ADD: CPT | Performed by: ORTHOPAEDIC SURGERY

## 2020-03-02 PROCEDURE — 25010000002 CEFAZOLIN PER 500 MG: Performed by: ORTHOPAEDIC SURGERY

## 2020-03-02 PROCEDURE — G0378 HOSPITAL OBSERVATION PER HR: HCPCS

## 2020-03-02 PROCEDURE — 20939 BONE MARROW ASPIR BONE GRFG: CPT | Performed by: ORTHOPAEDIC SURGERY

## 2020-03-02 PROCEDURE — 82962 GLUCOSE BLOOD TEST: CPT

## 2020-03-02 PROCEDURE — 25010000002 HEPARIN (PORCINE) PER 1000 UNITS: Performed by: ORTHOPAEDIC SURGERY

## 2020-03-02 PROCEDURE — 25010000002 NEOSTIGMINE PER 0.5 MG: Performed by: NURSE ANESTHETIST, CERTIFIED REGISTERED

## 2020-03-02 PROCEDURE — S0260 H&P FOR SURGERY: HCPCS | Performed by: ORTHOPAEDIC SURGERY

## 2020-03-02 PROCEDURE — 25010000002 DEXAMETHASONE PER 1 MG: Performed by: NURSE ANESTHETIST, CERTIFIED REGISTERED

## 2020-03-02 PROCEDURE — 25010000002 PROPOFOL 10 MG/ML EMULSION: Performed by: NURSE ANESTHETIST, CERTIFIED REGISTERED

## 2020-03-02 PROCEDURE — 76000 FLUOROSCOPY <1 HR PHYS/QHP: CPT

## 2020-03-02 PROCEDURE — 25010000002 FENTANYL CITRATE (PF) 100 MCG/2ML SOLUTION: Performed by: NURSE ANESTHETIST, CERTIFIED REGISTERED

## 2020-03-02 PROCEDURE — 63048 LAM FACETEC &FORAMOT EA ADDL: CPT | Performed by: SPECIALIST/TECHNOLOGIST, OTHER

## 2020-03-02 PROCEDURE — 72100 X-RAY EXAM L-S SPINE 2/3 VWS: CPT

## 2020-03-02 PROCEDURE — 25010000003 HYDROMORPHONE HCL PF 50 MG/5ML SOLUTION: Performed by: ORTHOPAEDIC SURGERY

## 2020-03-02 PROCEDURE — 22842 INSERT SPINE FIXATION DEVICE: CPT | Performed by: ORTHOPAEDIC SURGERY

## 2020-03-02 PROCEDURE — 25010000003 CEFAZOLIN IN DEXTROSE 2-4 GM/100ML-% SOLUTION: Performed by: ORTHOPAEDIC SURGERY

## 2020-03-02 PROCEDURE — 86901 BLOOD TYPING SEROLOGIC RH(D): CPT | Performed by: ORTHOPAEDIC SURGERY

## 2020-03-02 PROCEDURE — 85018 HEMOGLOBIN: CPT | Performed by: ORTHOPAEDIC SURGERY

## 2020-03-02 PROCEDURE — 22612 ARTHRD PST TQ 1NTRSPC LUMBAR: CPT | Performed by: ORTHOPAEDIC SURGERY

## 2020-03-02 PROCEDURE — 63047 LAM FACETEC & FORAMOT LUMBAR: CPT | Performed by: NEUROLOGICAL SURGERY

## 2020-03-02 PROCEDURE — 25010000002 HYDROMORPHONE PER 4 MG: Performed by: NURSE ANESTHETIST, CERTIFIED REGISTERED

## 2020-03-02 PROCEDURE — 86850 RBC ANTIBODY SCREEN: CPT | Performed by: ORTHOPAEDIC SURGERY

## 2020-03-02 PROCEDURE — 25010000002 MIDAZOLAM PER 1 MG: Performed by: ANESTHESIOLOGY

## 2020-03-02 PROCEDURE — 25010000003 CEFAZOLIN PER 500 MG: Performed by: ORTHOPAEDIC SURGERY

## 2020-03-02 PROCEDURE — P9041 ALBUMIN (HUMAN),5%, 50ML: HCPCS | Performed by: NURSE ANESTHETIST, CERTIFIED REGISTERED

## 2020-03-02 PROCEDURE — 85014 HEMATOCRIT: CPT | Performed by: ORTHOPAEDIC SURGERY

## 2020-03-02 PROCEDURE — 63048 LAM FACETEC &FORAMOT EA ADDL: CPT | Performed by: NEUROLOGICAL SURGERY

## 2020-03-02 PROCEDURE — 86900 BLOOD TYPING SEROLOGIC ABO: CPT | Performed by: ORTHOPAEDIC SURGERY

## 2020-03-02 PROCEDURE — 63047 LAM FACETEC & FORAMOT LUMBAR: CPT | Performed by: SPECIALIST/TECHNOLOGIST, OTHER

## 2020-03-02 PROCEDURE — 25010000002 ALBUMIN HUMAN 5% PER 50 ML: Performed by: NURSE ANESTHETIST, CERTIFIED REGISTERED

## 2020-03-02 DEVICE — SSC BONE WAX
Type: IMPLANTABLE DEVICE | Site: SPINE LUMBAR | Status: FUNCTIONAL
Brand: SSC BONE WAX

## 2020-03-02 DEVICE — MATRX STRIP PILAFX DBM 50X25X4MM: Type: IMPLANTABLE DEVICE | Site: SPINE LUMBAR | Status: FUNCTIONAL

## 2020-03-02 DEVICE — SCRW EXPEDIUM PA TI 6X45MM: Type: IMPLANTABLE DEVICE | Site: SPINE LUMBAR | Status: FUNCTIONAL

## 2020-03-02 DEVICE — ROD PREBNT SPINE EXPEDIUM TI 5.5X85MM: Type: IMPLANTABLE DEVICE | Site: SPINE LUMBAR | Status: FUNCTIONAL

## 2020-03-02 DEVICE — SCRW INNR EXPEDIUM: Type: IMPLANTABLE DEVICE | Site: SPINE LUMBAR | Status: FUNCTIONAL

## 2020-03-02 RX ORDER — MIDAZOLAM HYDROCHLORIDE 1 MG/ML
2 INJECTION INTRAMUSCULAR; INTRAVENOUS
Status: DISCONTINUED | OUTPATIENT
Start: 2020-03-02 | End: 2020-03-02 | Stop reason: HOSPADM

## 2020-03-02 RX ORDER — DEXAMETHASONE SODIUM PHOSPHATE 10 MG/ML
INJECTION INTRAMUSCULAR; INTRAVENOUS AS NEEDED
Status: DISCONTINUED | OUTPATIENT
Start: 2020-03-02 | End: 2020-03-02 | Stop reason: SURG

## 2020-03-02 RX ORDER — VALSARTAN 320 MG/1
320 TABLET ORAL DAILY
Status: DISCONTINUED | OUTPATIENT
Start: 2020-03-02 | End: 2020-03-05 | Stop reason: HOSPADM

## 2020-03-02 RX ORDER — OXYCODONE AND ACETAMINOPHEN 10; 325 MG/1; MG/1
2 TABLET ORAL EVERY 4 HOURS PRN
Status: DISCONTINUED | OUTPATIENT
Start: 2020-03-02 | End: 2020-03-05 | Stop reason: HOSPADM

## 2020-03-02 RX ORDER — ONDANSETRON 2 MG/ML
4 INJECTION INTRAMUSCULAR; INTRAVENOUS EVERY 6 HOURS PRN
Status: DISCONTINUED | OUTPATIENT
Start: 2020-03-02 | End: 2020-03-05 | Stop reason: HOSPADM

## 2020-03-02 RX ORDER — HYDROMORPHONE HCL 110MG/55ML
PATIENT CONTROLLED ANALGESIA SYRINGE INTRAVENOUS AS NEEDED
Status: DISCONTINUED | OUTPATIENT
Start: 2020-03-02 | End: 2020-03-02 | Stop reason: SURG

## 2020-03-02 RX ORDER — NALOXONE HCL 0.4 MG/ML
0.2 VIAL (ML) INJECTION AS NEEDED
Status: DISCONTINUED | OUTPATIENT
Start: 2020-03-02 | End: 2020-03-02 | Stop reason: HOSPADM

## 2020-03-02 RX ORDER — SODIUM CHLORIDE 9 MG/ML
100 INJECTION, SOLUTION INTRAVENOUS CONTINUOUS
Status: DISCONTINUED | OUTPATIENT
Start: 2020-03-02 | End: 2020-03-05 | Stop reason: HOSPADM

## 2020-03-02 RX ORDER — KETAMINE HYDROCHLORIDE 10 MG/ML
INJECTION INTRAMUSCULAR; INTRAVENOUS AS NEEDED
Status: DISCONTINUED | OUTPATIENT
Start: 2020-03-02 | End: 2020-03-02 | Stop reason: SURG

## 2020-03-02 RX ORDER — SENNA AND DOCUSATE SODIUM 50; 8.6 MG/1; MG/1
1 TABLET, FILM COATED ORAL 2 TIMES DAILY
Status: DISCONTINUED | OUTPATIENT
Start: 2020-03-02 | End: 2020-03-05 | Stop reason: HOSPADM

## 2020-03-02 RX ORDER — ROCURONIUM BROMIDE 10 MG/ML
INJECTION, SOLUTION INTRAVENOUS AS NEEDED
Status: DISCONTINUED | OUTPATIENT
Start: 2020-03-02 | End: 2020-03-02 | Stop reason: SURG

## 2020-03-02 RX ORDER — TEMAZEPAM 15 MG/1
15 CAPSULE ORAL NIGHTLY PRN
Status: DISCONTINUED | OUTPATIENT
Start: 2020-03-02 | End: 2020-03-05 | Stop reason: HOSPADM

## 2020-03-02 RX ORDER — SODIUM CHLORIDE, SODIUM LACTATE, POTASSIUM CHLORIDE, CALCIUM CHLORIDE 600; 310; 30; 20 MG/100ML; MG/100ML; MG/100ML; MG/100ML
9 INJECTION, SOLUTION INTRAVENOUS CONTINUOUS
Status: DISCONTINUED | OUTPATIENT
Start: 2020-03-02 | End: 2020-03-05 | Stop reason: HOSPADM

## 2020-03-02 RX ORDER — MAGNESIUM HYDROXIDE 1200 MG/15ML
LIQUID ORAL AS NEEDED
Status: DISCONTINUED | OUTPATIENT
Start: 2020-03-02 | End: 2020-03-02 | Stop reason: HOSPADM

## 2020-03-02 RX ORDER — SODIUM CHLORIDE, SODIUM LACTATE, POTASSIUM CHLORIDE, CALCIUM CHLORIDE 600; 310; 30; 20 MG/100ML; MG/100ML; MG/100ML; MG/100ML
100 INJECTION, SOLUTION INTRAVENOUS CONTINUOUS
Status: DISCONTINUED | OUTPATIENT
Start: 2020-03-02 | End: 2020-03-03

## 2020-03-02 RX ORDER — PROMETHAZINE HYDROCHLORIDE 25 MG/1
25 TABLET ORAL ONCE AS NEEDED
Status: DISCONTINUED | OUTPATIENT
Start: 2020-03-02 | End: 2020-03-02 | Stop reason: HOSPADM

## 2020-03-02 RX ORDER — ONDANSETRON 2 MG/ML
4 INJECTION INTRAMUSCULAR; INTRAVENOUS ONCE AS NEEDED
Status: DISCONTINUED | OUTPATIENT
Start: 2020-03-02 | End: 2020-03-02 | Stop reason: HOSPADM

## 2020-03-02 RX ORDER — ONDANSETRON 4 MG/1
4 TABLET, FILM COATED ORAL EVERY 6 HOURS PRN
Status: DISCONTINUED | OUTPATIENT
Start: 2020-03-02 | End: 2020-03-05 | Stop reason: HOSPADM

## 2020-03-02 RX ORDER — HYDROMORPHONE HCL IN 0.9% NACL 10 MG/50ML
PATIENT CONTROLLED ANALGESIA SYRINGE INTRAVENOUS CONTINUOUS
Status: DISCONTINUED | OUTPATIENT
Start: 2020-03-02 | End: 2020-03-04

## 2020-03-02 RX ORDER — SODIUM CHLORIDE 0.9 % (FLUSH) 0.9 %
3 SYRINGE (ML) INJECTION EVERY 12 HOURS SCHEDULED
Status: DISCONTINUED | OUTPATIENT
Start: 2020-03-02 | End: 2020-03-02 | Stop reason: HOSPADM

## 2020-03-02 RX ORDER — PROMETHAZINE HYDROCHLORIDE 25 MG/ML
6.25 INJECTION, SOLUTION INTRAMUSCULAR; INTRAVENOUS
Status: DISCONTINUED | OUTPATIENT
Start: 2020-03-02 | End: 2020-03-02 | Stop reason: HOSPADM

## 2020-03-02 RX ORDER — EPHEDRINE SULFATE 50 MG/ML
INJECTION, SOLUTION INTRAVENOUS AS NEEDED
Status: DISCONTINUED | OUTPATIENT
Start: 2020-03-02 | End: 2020-03-02 | Stop reason: SURG

## 2020-03-02 RX ORDER — SODIUM CHLORIDE 0.9 % (FLUSH) 0.9 %
10 SYRINGE (ML) INJECTION AS NEEDED
Status: DISCONTINUED | OUTPATIENT
Start: 2020-03-02 | End: 2020-03-05 | Stop reason: HOSPADM

## 2020-03-02 RX ORDER — ALBUTEROL SULFATE 2.5 MG/3ML
2.5 SOLUTION RESPIRATORY (INHALATION) ONCE AS NEEDED
Status: DISCONTINUED | OUTPATIENT
Start: 2020-03-02 | End: 2020-03-02 | Stop reason: HOSPADM

## 2020-03-02 RX ORDER — ACETAMINOPHEN 500 MG
1000 TABLET ORAL ONCE
Status: COMPLETED | OUTPATIENT
Start: 2020-03-02 | End: 2020-03-02

## 2020-03-02 RX ORDER — EPHEDRINE SULFATE 50 MG/ML
5 INJECTION, SOLUTION INTRAVENOUS ONCE AS NEEDED
Status: DISCONTINUED | OUTPATIENT
Start: 2020-03-02 | End: 2020-03-02 | Stop reason: HOSPADM

## 2020-03-02 RX ORDER — ALBUMIN, HUMAN INJ 5% 5 %
SOLUTION INTRAVENOUS CONTINUOUS PRN
Status: DISCONTINUED | OUTPATIENT
Start: 2020-03-02 | End: 2020-03-02 | Stop reason: SURG

## 2020-03-02 RX ORDER — SODIUM CHLORIDE 0.9 % (FLUSH) 0.9 %
3 SYRINGE (ML) INJECTION EVERY 12 HOURS SCHEDULED
Status: DISCONTINUED | OUTPATIENT
Start: 2020-03-02 | End: 2020-03-05 | Stop reason: HOSPADM

## 2020-03-02 RX ORDER — CEFAZOLIN SODIUM 2 G/100ML
2 INJECTION, SOLUTION INTRAVENOUS ONCE
Status: COMPLETED | OUTPATIENT
Start: 2020-03-02 | End: 2020-03-02

## 2020-03-02 RX ORDER — CEFAZOLIN SODIUM IN 0.9 % NACL 3 G/100 ML
3 INTRAVENOUS SOLUTION, PIGGYBACK (ML) INTRAVENOUS EVERY 8 HOURS
Status: COMPLETED | OUTPATIENT
Start: 2020-03-02 | End: 2020-03-03

## 2020-03-02 RX ORDER — FENTANYL CITRATE 50 UG/ML
50 INJECTION, SOLUTION INTRAMUSCULAR; INTRAVENOUS
Status: DISCONTINUED | OUTPATIENT
Start: 2020-03-02 | End: 2020-03-02 | Stop reason: HOSPADM

## 2020-03-02 RX ORDER — LABETALOL HYDROCHLORIDE 5 MG/ML
5 INJECTION, SOLUTION INTRAVENOUS
Status: DISCONTINUED | OUTPATIENT
Start: 2020-03-02 | End: 2020-03-02 | Stop reason: HOSPADM

## 2020-03-02 RX ORDER — MIDAZOLAM HYDROCHLORIDE 1 MG/ML
1 INJECTION INTRAMUSCULAR; INTRAVENOUS
Status: DISCONTINUED | OUTPATIENT
Start: 2020-03-02 | End: 2020-03-02 | Stop reason: HOSPADM

## 2020-03-02 RX ORDER — LORAZEPAM 1 MG/1
1 TABLET ORAL 2 TIMES DAILY PRN
Status: DISCONTINUED | OUTPATIENT
Start: 2020-03-02 | End: 2020-03-05 | Stop reason: HOSPADM

## 2020-03-02 RX ORDER — HYDROCODONE BITARTRATE AND ACETAMINOPHEN 7.5; 325 MG/1; MG/1
1 TABLET ORAL ONCE AS NEEDED
Status: DISCONTINUED | OUTPATIENT
Start: 2020-03-02 | End: 2020-03-02 | Stop reason: HOSPADM

## 2020-03-02 RX ORDER — FENTANYL CITRATE 50 UG/ML
INJECTION, SOLUTION INTRAMUSCULAR; INTRAVENOUS AS NEEDED
Status: DISCONTINUED | OUTPATIENT
Start: 2020-03-02 | End: 2020-03-02 | Stop reason: SURG

## 2020-03-02 RX ORDER — PROMETHAZINE HYDROCHLORIDE 25 MG/ML
12.5 INJECTION, SOLUTION INTRAMUSCULAR; INTRAVENOUS ONCE AS NEEDED
Status: DISCONTINUED | OUTPATIENT
Start: 2020-03-02 | End: 2020-03-02 | Stop reason: HOSPADM

## 2020-03-02 RX ORDER — GLYCOPYRROLATE 0.2 MG/ML
INJECTION INTRAMUSCULAR; INTRAVENOUS AS NEEDED
Status: DISCONTINUED | OUTPATIENT
Start: 2020-03-02 | End: 2020-03-02 | Stop reason: SURG

## 2020-03-02 RX ORDER — FAMOTIDINE 10 MG/ML
20 INJECTION, SOLUTION INTRAVENOUS ONCE
Status: COMPLETED | OUTPATIENT
Start: 2020-03-02 | End: 2020-03-02

## 2020-03-02 RX ORDER — BISACODYL 10 MG
10 SUPPOSITORY, RECTAL RECTAL DAILY PRN
Status: DISCONTINUED | OUTPATIENT
Start: 2020-03-02 | End: 2020-03-05 | Stop reason: HOSPADM

## 2020-03-02 RX ORDER — DIPHENHYDRAMINE HCL 25 MG
25 CAPSULE ORAL
Status: DISCONTINUED | OUTPATIENT
Start: 2020-03-02 | End: 2020-03-02 | Stop reason: HOSPADM

## 2020-03-02 RX ORDER — NALOXONE HCL 0.4 MG/ML
0.1 VIAL (ML) INJECTION
Status: DISCONTINUED | OUTPATIENT
Start: 2020-03-02 | End: 2020-03-04

## 2020-03-02 RX ORDER — LIDOCAINE HYDROCHLORIDE 20 MG/ML
INJECTION, SOLUTION INFILTRATION; PERINEURAL AS NEEDED
Status: DISCONTINUED | OUTPATIENT
Start: 2020-03-02 | End: 2020-03-02 | Stop reason: SURG

## 2020-03-02 RX ORDER — OXYCODONE AND ACETAMINOPHEN 7.5; 325 MG/1; MG/1
1 TABLET ORAL ONCE AS NEEDED
Status: DISCONTINUED | OUTPATIENT
Start: 2020-03-02 | End: 2020-03-02 | Stop reason: HOSPADM

## 2020-03-02 RX ORDER — SCOLOPAMINE TRANSDERMAL SYSTEM 1 MG/1
1 PATCH, EXTENDED RELEASE TRANSDERMAL CONTINUOUS
Status: DISPENSED | OUTPATIENT
Start: 2020-03-02 | End: 2020-03-03

## 2020-03-02 RX ORDER — DIPHENHYDRAMINE HYDROCHLORIDE 50 MG/ML
12.5 INJECTION INTRAMUSCULAR; INTRAVENOUS
Status: DISCONTINUED | OUTPATIENT
Start: 2020-03-02 | End: 2020-03-02 | Stop reason: HOSPADM

## 2020-03-02 RX ORDER — FLUMAZENIL 0.1 MG/ML
0.2 INJECTION INTRAVENOUS AS NEEDED
Status: DISCONTINUED | OUTPATIENT
Start: 2020-03-02 | End: 2020-03-02 | Stop reason: HOSPADM

## 2020-03-02 RX ORDER — ONDANSETRON 2 MG/ML
INJECTION INTRAMUSCULAR; INTRAVENOUS AS NEEDED
Status: DISCONTINUED | OUTPATIENT
Start: 2020-03-02 | End: 2020-03-02 | Stop reason: SURG

## 2020-03-02 RX ORDER — HYDRALAZINE HYDROCHLORIDE 20 MG/ML
5 INJECTION INTRAMUSCULAR; INTRAVENOUS
Status: DISCONTINUED | OUTPATIENT
Start: 2020-03-02 | End: 2020-03-02 | Stop reason: HOSPADM

## 2020-03-02 RX ORDER — PROPOFOL 10 MG/ML
VIAL (ML) INTRAVENOUS AS NEEDED
Status: DISCONTINUED | OUTPATIENT
Start: 2020-03-02 | End: 2020-03-02 | Stop reason: SURG

## 2020-03-02 RX ORDER — SODIUM CHLORIDE 0.9 % (FLUSH) 0.9 %
3-10 SYRINGE (ML) INJECTION AS NEEDED
Status: DISCONTINUED | OUTPATIENT
Start: 2020-03-02 | End: 2020-03-02 | Stop reason: HOSPADM

## 2020-03-02 RX ORDER — GABAPENTIN 300 MG/1
600 CAPSULE ORAL ONCE
Status: COMPLETED | OUTPATIENT
Start: 2020-03-02 | End: 2020-03-02

## 2020-03-02 RX ORDER — HYDROMORPHONE HYDROCHLORIDE 1 MG/ML
0.5 INJECTION, SOLUTION INTRAMUSCULAR; INTRAVENOUS; SUBCUTANEOUS
Status: DISCONTINUED | OUTPATIENT
Start: 2020-03-02 | End: 2020-03-02 | Stop reason: HOSPADM

## 2020-03-02 RX ORDER — PROMETHAZINE HYDROCHLORIDE 25 MG/1
25 SUPPOSITORY RECTAL ONCE AS NEEDED
Status: DISCONTINUED | OUTPATIENT
Start: 2020-03-02 | End: 2020-03-02 | Stop reason: HOSPADM

## 2020-03-02 RX ORDER — ACETAMINOPHEN 325 MG/1
650 TABLET ORAL ONCE AS NEEDED
Status: DISCONTINUED | OUTPATIENT
Start: 2020-03-02 | End: 2020-03-02 | Stop reason: HOSPADM

## 2020-03-02 RX ORDER — CYCLOBENZAPRINE HCL 10 MG
10 TABLET ORAL 3 TIMES DAILY PRN
Status: DISCONTINUED | OUTPATIENT
Start: 2020-03-02 | End: 2020-03-05 | Stop reason: HOSPADM

## 2020-03-02 RX ORDER — KETAMINE HCL IN NACL, ISO-OSM 100MG/10ML
SYRINGE (ML) INJECTION
Status: DISPENSED
Start: 2020-03-02 | End: 2020-03-02

## 2020-03-02 RX ADMIN — FENTANYL CITRATE 100 MCG: 50 INJECTION INTRAMUSCULAR; INTRAVENOUS at 07:35

## 2020-03-02 RX ADMIN — SCOPALAMINE 1 PATCH: 1 PATCH, EXTENDED RELEASE TRANSDERMAL at 06:58

## 2020-03-02 RX ADMIN — EPHEDRINE SULFATE 10 MG: 50 INJECTION INTRAVENOUS at 08:38

## 2020-03-02 RX ADMIN — SODIUM CHLORIDE, POTASSIUM CHLORIDE, SODIUM LACTATE AND CALCIUM CHLORIDE: 600; 310; 30; 20 INJECTION, SOLUTION INTRAVENOUS at 10:24

## 2020-03-02 RX ADMIN — CEFAZOLIN 3 G: 10 INJECTION, POWDER, FOR SOLUTION INTRAVENOUS at 18:19

## 2020-03-02 RX ADMIN — ROCURONIUM BROMIDE 20 MG: 10 INJECTION, SOLUTION INTRAVENOUS at 08:25

## 2020-03-02 RX ADMIN — EPHEDRINE SULFATE 10 MG: 50 INJECTION INTRAVENOUS at 09:50

## 2020-03-02 RX ADMIN — DEXAMETHASONE SODIUM PHOSPHATE 8 MG: 10 INJECTION INTRAMUSCULAR; INTRAVENOUS at 10:50

## 2020-03-02 RX ADMIN — KETAMINE HYDROCHLORIDE 20 MG: 10 INJECTION INTRAMUSCULAR; INTRAVENOUS at 10:53

## 2020-03-02 RX ADMIN — SODIUM CHLORIDE 100 ML/HR: 9 INJECTION, SOLUTION INTRAVENOUS at 23:56

## 2020-03-02 RX ADMIN — MIDAZOLAM 2 MG: 1 INJECTION INTRAMUSCULAR; INTRAVENOUS at 07:01

## 2020-03-02 RX ADMIN — LIDOCAINE HYDROCHLORIDE 100 MG: 20 INJECTION, SOLUTION INFILTRATION; PERINEURAL at 07:35

## 2020-03-02 RX ADMIN — ROCURONIUM BROMIDE 10 MG: 10 INJECTION, SOLUTION INTRAVENOUS at 10:20

## 2020-03-02 RX ADMIN — EPHEDRINE SULFATE 10 MG: 50 INJECTION INTRAVENOUS at 08:23

## 2020-03-02 RX ADMIN — FENTANYL CITRATE 50 MCG: 50 INJECTION INTRAMUSCULAR; INTRAVENOUS at 11:04

## 2020-03-02 RX ADMIN — NEOSTIGMINE METHYLSULFATE 3 MG: 1 INJECTION INTRAMUSCULAR; INTRAVENOUS; SUBCUTANEOUS at 11:06

## 2020-03-02 RX ADMIN — ALBUMIN HUMAN: 0.05 INJECTION, SOLUTION INTRAVENOUS at 09:31

## 2020-03-02 RX ADMIN — KETAMINE HYDROCHLORIDE 10 MG: 10 INJECTION INTRAMUSCULAR; INTRAVENOUS at 09:00

## 2020-03-02 RX ADMIN — HYDROMORPHONE HYDROCHLORIDE: 10 INJECTION, SOLUTION INTRAMUSCULAR; INTRAVENOUS; SUBCUTANEOUS at 11:34

## 2020-03-02 RX ADMIN — POLYETHYLENE GLYCOL 3350 17 G: 17 POWDER, FOR SOLUTION ORAL at 16:56

## 2020-03-02 RX ADMIN — OXYCODONE HYDROCHLORIDE AND ACETAMINOPHEN 2 TABLET: 10; 325 TABLET ORAL at 21:14

## 2020-03-02 RX ADMIN — PROPOFOL 250 MG: 10 INJECTION, EMULSION INTRAVENOUS at 07:35

## 2020-03-02 RX ADMIN — KETAMINE HYDROCHLORIDE 50 MG: 10 INJECTION INTRAMUSCULAR; INTRAVENOUS at 08:00

## 2020-03-02 RX ADMIN — FAMOTIDINE 20 MG: 10 INJECTION INTRAVENOUS at 07:00

## 2020-03-02 RX ADMIN — KETAMINE HYDROCHLORIDE 10 MG: 10 INJECTION INTRAMUSCULAR; INTRAVENOUS at 11:09

## 2020-03-02 RX ADMIN — KETAMINE HYDROCHLORIDE 10 MG: 10 INJECTION INTRAMUSCULAR; INTRAVENOUS at 10:00

## 2020-03-02 RX ADMIN — SODIUM CHLORIDE 100 ML/HR: 9 INJECTION, SOLUTION INTRAVENOUS at 13:39

## 2020-03-02 RX ADMIN — ROCURONIUM BROMIDE 50 MG: 10 INJECTION, SOLUTION INTRAVENOUS at 07:35

## 2020-03-02 RX ADMIN — OXYCODONE HYDROCHLORIDE AND ACETAMINOPHEN 2 TABLET: 10; 325 TABLET ORAL at 16:56

## 2020-03-02 RX ADMIN — GLYCOPYRROLATE 0.6 MG: 0.2 INJECTION INTRAMUSCULAR; INTRAVENOUS at 11:06

## 2020-03-02 RX ADMIN — ROCURONIUM BROMIDE 10 MG: 10 INJECTION, SOLUTION INTRAVENOUS at 09:15

## 2020-03-02 RX ADMIN — CYCLOBENZAPRINE 10 MG: 10 TABLET, FILM COATED ORAL at 12:05

## 2020-03-02 RX ADMIN — FENTANYL CITRATE 50 MCG: 50 INJECTION, SOLUTION INTRAMUSCULAR; INTRAVENOUS at 11:54

## 2020-03-02 RX ADMIN — GABAPENTIN 600 MG: 300 CAPSULE ORAL at 06:57

## 2020-03-02 RX ADMIN — HYDROMORPHONE HYDROCHLORIDE 1 MG: 2 INJECTION, SOLUTION INTRAMUSCULAR; INTRAVENOUS; SUBCUTANEOUS at 10:59

## 2020-03-02 RX ADMIN — CEFAZOLIN SODIUM 2 G: 2 INJECTION, SOLUTION INTRAVENOUS at 07:36

## 2020-03-02 RX ADMIN — SODIUM CHLORIDE, POTASSIUM CHLORIDE, SODIUM LACTATE AND CALCIUM CHLORIDE 9 ML/HR: 600; 310; 30; 20 INJECTION, SOLUTION INTRAVENOUS at 06:20

## 2020-03-02 RX ADMIN — CYCLOBENZAPRINE 10 MG: 10 TABLET, FILM COATED ORAL at 21:14

## 2020-03-02 RX ADMIN — SENNOSIDES AND DOCUSATE SODIUM 1 TABLET: 8.6; 5 TABLET ORAL at 21:14

## 2020-03-02 RX ADMIN — HYDROMORPHONE HYDROCHLORIDE 0.5 MG: 1 INJECTION, SOLUTION INTRAMUSCULAR; INTRAVENOUS; SUBCUTANEOUS at 12:07

## 2020-03-02 RX ADMIN — FENTANYL CITRATE 50 MCG: 50 INJECTION INTRAMUSCULAR; INTRAVENOUS at 11:09

## 2020-03-02 RX ADMIN — FENTANYL CITRATE 50 MCG: 50 INJECTION, SOLUTION INTRAMUSCULAR; INTRAVENOUS at 11:42

## 2020-03-02 RX ADMIN — ACETAMINOPHEN 1000 MG: 500 TABLET, FILM COATED ORAL at 06:57

## 2020-03-02 RX ADMIN — VALSARTAN 320 MG: 320 TABLET ORAL at 18:19

## 2020-03-02 RX ADMIN — ONDANSETRON HYDROCHLORIDE 4 MG: 2 SOLUTION INTRAMUSCULAR; INTRAVENOUS at 11:09

## 2020-03-02 NOTE — ANESTHESIA PREPROCEDURE EVALUATION
Anesthesia Evaluation     history of anesthetic complications: PONV               Airway   Mallampati: II  Neck ROM: full  no difficulty expected  Dental - normal exam     Pulmonary - normal exam   (+) a smoker Former,   (-) COPD, asthmaSleep apnea: STOPBANG 7.    PE comment: nonlabored  Cardiovascular - normal exam    Rhythm: regular  Rate: normal    (+) hypertension,   (-) valvular problems/murmurs, past MI, CAD, dysrhythmias, angina      Neuro/Psych  (+) weakness (R>L LEs due to back issues), numbness, psychiatric history Anxiety,     (-) seizures, TIA, CVA    ROS Comment: Neurogenic claudication  GI/Hepatic/Renal/Endo    (+) morbid obesity,    (-) GERD, liver disease, no renal disease, diabetes, no thyroid disorder    Musculoskeletal     (+) arthralgias, back pain,   Abdominal    Substance History      OB/GYN          Other   arthritis,        Other Comment: McArdle's disease                  Anesthesia Plan    ASA 3     general       Anesthetic plan, all risks, benefits, and alternatives have been provided, discussed and informed consent has been obtained with: patient.

## 2020-03-02 NOTE — ANESTHESIA POSTPROCEDURE EVALUATION
Patient: Philip Calloway    Procedure Summary     Date:  03/02/20 Room / Location:  [facility] OR  / [facility] MAIN OR    Anesthesia Start:  0727 Anesthesia Stop:  1125    Procedures:       Lumbar 3 to lumbar 5 fusion with instrumentation with laminectomy by Dr. Esquivel (N/A Spine Lumbar)      Lumbar 3 to lumbar 4 and lumbar 4 lumbar 5 laminectomy and neural lysis the fusion by orthopedics (N/A Spine Lumbar) Diagnosis:       Spondylolisthesis of lumbar region      Spinal stenosis of lumbar region with neurogenic claudication      (Spondylolisthesis of lumbar region [M43.16])      (Spinal stenosis of lumbar region with neurogenic claudication [M48.062])    Surgeon:  Jose Bourne MD; Samuel Esquivel MD Provider:  Dewey Davis MD    Anesthesia Type:  general ASA Status:  3          Anesthesia Type: general    Vitals  Vitals Value Taken Time   /69 3/2/2020 12:10 PM   Temp 37.2 °C (98.9 °F) 3/2/2020 11:23 AM   Pulse 78 3/2/2020 12:19 PM   Resp 16 3/2/2020 12:10 PM   SpO2 96 % 3/2/2020 12:19 PM   Vitals shown include unvalidated device data.        Post Anesthesia Care and Evaluation    Patient location during evaluation: PACU  Anesthetic complications: No anesthetic complications

## 2020-03-02 NOTE — OP NOTE
Preoperative diagnosis: Lumbar stenosis with neurogenic claudication L3-4 and L4-5    Postoperative diagnosis: Same    Procedure performed: Bilateral L3-4 and L4-5 laminectomy with a neural lysis at L4-5 on the left side as well as medial facetectomy and foraminotomies    Surgeon: Samuel Esquivel M.D.    Asst.: Ean Tobar CFA who was instrumental in helping with hemostasis, visualization of neural structures and retraction of neural structures    Estimated blood loss, crystalloid, colloid, blood: Please see anesthesia record    Material to lab:   None    Drains: None    Complications: None    Indications for the procedure: This is a patient with a severe lumbar stenosis at the above levels.  He had previously tried nonsurgical treatment but did not get better.  Consequently elected to proceed with surgery.    Operative summary:  The patient was brought into the operating room and placed under general endotracheal anesthesia using intravenous and inhalational agents.  The patient was then positioned on the operating table in the prone position.  All pressure points were padded including peripheral points of entrapment.  I came into the case with the back open and the screws placed.  I proceeded to remove the superior spinous process of L5 as well as the entire spinous process of L4 and L3.  The respective lamina arches were then thinned down until they were paper thin with the Fredy drill and cracked directly back off the dura.  Following this the micro instruments were used to manipulate the nerve and the dura off of the pedicles and the spinous process on the right side at L3, L4 and L5.  I then removed the medial portion of the facet and open the foramina at each of those levels thoroughly decompressing all 3 nerves.  Attention was then turned to the left side where the same thing was done.  There was quite a bit of scar tissue between the L4 and L5 laminar arches on that side because of previous surgery.   Once the III nerve roots on the left side were thoroughly decompressed I checked each of the 6 nerves with a Pink ball probe to be sure there was no evidence of residual pressure.  There being none the operation was turned back over to Dr. Avilez for completion of the fusion.  The sponge, instrument and needle counts were correct at the end of the procedure.

## 2020-03-02 NOTE — ANESTHESIA PROCEDURE NOTES
Airway  Urgency: elective    Date/Time: 3/2/2020 7:36 AM  Airway not difficult    General Information and Staff    Patient location during procedure: OR  Anesthesiologist: Dewey Davis MD  CRNA: Vianca Kaplan CRNA    Indications and Patient Condition  Indications for airway management: airway protection    Preoxygenated: yes  MILS not maintained throughout  Mask difficulty assessment: 1 - vent by mask    Final Airway Details  Final airway type: endotracheal airway      Successful airway: ETT  Cuffed: yes   Successful intubation technique: direct laryngoscopy  Facilitating devices/methods: intubating stylet  Endotracheal tube insertion site: oral  Blade: Nancy  Blade size: 3  ETT size (mm): 8.0  Cormack-Lehane Classification: grade I - full view of glottis  Placement verified by: chest auscultation   Cuff volume (mL): 9  Measured from: lips  Number of attempts at approach: 1  Assessment: lips, teeth, and gum same as pre-op and atraumatic intubation    Additional Comments  PreO2 100% face mask, IV induction, easy mask, DVL x1, cords noted, tube through, cuff up, EBBSH, +etCO2, = chest movement, tube secured in place, atraumatic, teeth and lips intact as preop.

## 2020-03-02 NOTE — OP NOTE
Operative note    Pre-op diagnosis: L3-4 L4-5 spondylolisthesis and spinal stenosis    Postoperative diagnosis: Same    Procedure: L3-4 L4-5 bilateral posterior lateral fusion with AcroMed expedient segmental instrumentation with local bone graft, right iliac marrow aspiration, and Pliafix  bone graft substitute.    Surgeon: Jose Bourne Jr, M.D.    Asst.: Shannon Love    EBL: 300 cc    Anesthetic: Gen.    Condition: Satisfactory    Dr. Barry Esquivel performed the laminectomy    Description of procedure: Patient was anesthetized and positioned prone.  Bony prominences were well padded.  The back was prepped and draped in sterile fashion.  Incision was made down to lumbodorsal fascia.  The muscle was stripped subperiosteally to the tips of transverse processes.  Curettes and rongeurs removed adherent soft tissue.  Packs were placed for hemostasis.  The graft was decorticated.  A Steffee probe was inserted at the intersection of the anatomic landmarks.  A flexible probe was inserted to check the integrity of the pedicle.  Screws were placed at L3-L5 bilaterally .contoured rods were later added, nuts were tightened and torqued.  Biplanar image intensification showed appropriate screw position and anatomic level and satisfactory posture.  At this point Dr. Esquivel entered the room and performed his laminectomies.  See the note for details.  I returned the dural sac was decompressed and otherwise intact.  Bone marrow was obtained from the right iliac crest.  The marrow was applied to the Pliafix sponges.  Laminectomy bone, and bone from decortication of the graft bed was cleaned at the back table throughout the case and available for bone graft.  The morcellized bone was placed in the decorticated lateral gutter bilaterally.  Pliafix sponges were then placed.  Hemostasis was assured.  The wound was then closed with running and interrupted #1 Vicryl for the dorsal fascia, 2-0 Vicryl subcutaneously, then 4-0 Monocryl and  Dermabond for the skin.  A sterile dressing was applied.  The patient is about to be recovered.

## 2020-03-02 NOTE — H&P
Chief Complaint   Patient presents with   • Lumbar Spine - Pain, Establish Care         HPI: He is seen today complaining of low back pain which radiates into the legs bilaterally and is associated with weakness therein.  He also notes a burning aching pain in the legs which is constant.  Pain is moderate to severe and fails to improve with facet blocks and physical therapy although epidural injections did help significantly but temporarily.  He is seen today at request of Dr. Miranda.  Currently he takes Percocet and meloxicam for this.     PFSH: See chart- reviewed     Review of Systems   Constitutional: Positive for activity change and fatigue. Negative for chills, fever and unexpected weight change.   HENT: Positive for sneezing and tinnitus. Negative for trouble swallowing and voice change.    Eyes: Negative for visual disturbance.   Respiratory: Positive for shortness of breath. Negative for cough.    Cardiovascular: Positive for palpitations and leg swelling (renita legs). Negative for chest pain.   Gastrointestinal: Positive for rectal pain. Negative for abdominal pain, nausea and vomiting.   Endocrine: Negative for cold intolerance and heat intolerance.   Genitourinary: Negative for difficulty urinating, frequency and urgency.   Musculoskeletal: Positive for arthralgias, back pain and myalgias.   Skin: Negative for rash and wound.   Allergic/Immunologic: Negative for immunocompromised state.   Neurological: Positive for weakness and numbness (right leg).   Hematological: Does not bruise/bleed easily.   Psychiatric/Behavioral: Positive for dysphoric mood. The patient is nervous/anxious.          PE: Constitutional: Vital signs above-noted.  Awake, alert and oriented     Psychiatric: Affect and insight do not appear grossly disturbed.     Pulmonary: Breathing is unlabored, color is good.     Skin: Warm, dry and normal turgor     Cardiac: Pedal pulses intact.  No edema.     Eyesight and hearing appear adequate for  examination purposes        Musculoskeletal:  There is some tenderness to percussion and palpation of the spine. Motion appears undisturbed.  Posture is unremarkable to coronal and sagittal inspection.    The skin about the area is intact.  There is no palpable or visible deformity.  There is no local spasm.       Neurologic:   Reflexes are 2+ and symmetrical in the patellae and absent in the Achilles.   Motor function is undisturbed in quadriceps, EHL, and gastrocnemius   sensation appears symmetrically intact to light touch except for an area in the lateral right leg which is numb.  In the bilateral lower extremities there is no evidence of atrophy.   Clonus is absent..  Gait appears undisturbed.  SLR test negative        MEDICAL DECISION MAKING     XRAY: Plain film x-rays lumbar spine show loss of lordosis, L3-4 chondrocalcinosis, and multilevel moderate disc degeneration.  As these were taken after the myelographic contrast injection this could represent L3-4 intradiscal contrast agent.  For purposes of surgical planning it does not really matter which.  At L4-5 there is a hint of anterolisthesis.  On myelogram and CT scan there appears to be less than unstable motion at L4-5 on flexion-extension films but no obvious neurologic compression.  Post myelogram CT scan demonstrates mild stenosis at L2-3 moderate changes at 3 4 and 4 5 including some foraminal narrowing from disc protrusion on the right side at L4-5.     Other: I reviewed Dr. Clark's notes and impression with which I agree.  He is also concerned about L5-S1.     Impression: He has loss of lordosis and multilevel degenerative changes but more leg than back pain.  There is L4-5 spondylolisthesis.  I would be inclined to confine the fusion to any decompressed levels which I believe will be L3-4 and L4-5.  If at L5-S1 is significantly decompressed we will fuse it as well.     Plan: L3-5 fusion with instrumentation and laminectomy by Dr. Esquivel.  I  discussed the risks and benefits of posterior spinal fusion, including where applicable, laminectomy.  Risks include adverse anesthetic events such as death, stroke and myocardial infarction.  More specific surgical risks include infection, nonunion, hardware failure, spinal fluid leakage, nerve root injury or paralysis, visceral or vascular injury, persistent pain, deep venous thrombosis, and pulmonary embolism among others. There is a 70 to 90 % chance of success.   Alternatives have been discussed.  After careful consideration the patient wishes to proceed with surgery.

## 2020-03-02 NOTE — BRIEF OP NOTE
LUMBAR LAMINECTOMY DISCECTOMY WITH FUSION  Progress Note    Philip Calloway  3/2/2020    Pre-op Diagnosis:   Spondylolisthesis of lumbar region [M43.16]  Spinal stenosis of lumbar region with neurogenic claudication [M48.062]       Post-Op Diagnosis Codes:     * Spondylolisthesis of lumbar region [M43.16]     * Spinal stenosis of lumbar region with neurogenic claudication [M48.062]    Procedure/CPT® Codes:      Procedure(s):  Lumbar 3 to lumbar 5 fusion with instrumentation with laminectomy by Dr. Esquivel  Lumbar 3 to lumbar 4 and lumbar 4 lumbar 5 laminectomy and neural lysis the fusion by orthopedics    Surgeon(s):  Jose Bourne MD Werner, Joseph G, MD Reiss, Samuel FONSECA MD    Anesthesia: General    Staff:   Cell Saver : Addie Lund  Circulator: Marietta Collier RN; Vinita Betancourt RN  Radiology Technologist: Malia Hernandez  Scrub Person: Mars Campuzano  Vendor Representative: Sherie Baker  Assistant: Ena Tobar CSA; Shannon Love RNFA    Estimated Blood Loss: 300 mL    Urine Voided: 250 mL    Specimens:                None          Drains:   Urethral Catheter Silicone 16 Fr. (Active)   Site Assessment Clean;Skin intact 3/2/2020 11:23 AM   Collection Container Standard drainage bag 3/2/2020 11:23 AM   Securement Method Securing device 3/2/2020 11:23 AM   Output (mL) 700 mL 3/2/2020  3:00 PM       Findings: Severe stenosis    Complications: None      Samuel Esquivel MD     Date: 3/2/2020  Time: 3:19 PM

## 2020-03-03 LAB
ANION GAP SERPL CALCULATED.3IONS-SCNC: 10.5 MMOL/L (ref 5–15)
BUN BLD-MCNC: 9 MG/DL (ref 6–20)
BUN/CREAT SERPL: 14.5 (ref 7–25)
CALCIUM SPEC-SCNC: 8.2 MG/DL (ref 8.6–10.5)
CHLORIDE SERPL-SCNC: 104 MMOL/L (ref 98–107)
CO2 SERPL-SCNC: 26.5 MMOL/L (ref 22–29)
CREAT BLD-MCNC: 0.62 MG/DL (ref 0.76–1.27)
GFR SERPL CREATININE-BSD FRML MDRD: 134 ML/MIN/1.73
GLUCOSE BLD-MCNC: 132 MG/DL (ref 65–99)
HCT VFR BLD AUTO: 33.3 % (ref 37.5–51)
HGB BLD-MCNC: 11.7 G/DL (ref 13–17.7)
POTASSIUM BLD-SCNC: 4.2 MMOL/L (ref 3.5–5.2)
SODIUM BLD-SCNC: 141 MMOL/L (ref 136–145)

## 2020-03-03 PROCEDURE — 85018 HEMOGLOBIN: CPT | Performed by: ORTHOPAEDIC SURGERY

## 2020-03-03 PROCEDURE — 99024 POSTOP FOLLOW-UP VISIT: CPT | Performed by: NURSE PRACTITIONER

## 2020-03-03 PROCEDURE — 97162 PT EVAL MOD COMPLEX 30 MIN: CPT

## 2020-03-03 PROCEDURE — G0378 HOSPITAL OBSERVATION PER HR: HCPCS

## 2020-03-03 PROCEDURE — 97110 THERAPEUTIC EXERCISES: CPT

## 2020-03-03 PROCEDURE — 25010000002 CEFAZOLIN PER 500 MG: Performed by: ORTHOPAEDIC SURGERY

## 2020-03-03 PROCEDURE — 99024 POSTOP FOLLOW-UP VISIT: CPT | Performed by: ORTHOPAEDIC SURGERY

## 2020-03-03 PROCEDURE — 85014 HEMATOCRIT: CPT | Performed by: ORTHOPAEDIC SURGERY

## 2020-03-03 PROCEDURE — 80048 BASIC METABOLIC PNL TOTAL CA: CPT | Performed by: ORTHOPAEDIC SURGERY

## 2020-03-03 RX ADMIN — CYCLOBENZAPRINE 10 MG: 10 TABLET, FILM COATED ORAL at 23:12

## 2020-03-03 RX ADMIN — POLYETHYLENE GLYCOL 3350 17 G: 17 POWDER, FOR SOLUTION ORAL at 09:40

## 2020-03-03 RX ADMIN — OXYCODONE HYDROCHLORIDE AND ACETAMINOPHEN 2 TABLET: 10; 325 TABLET ORAL at 20:55

## 2020-03-03 RX ADMIN — SENNOSIDES AND DOCUSATE SODIUM 1 TABLET: 8.6; 5 TABLET ORAL at 20:55

## 2020-03-03 RX ADMIN — OXYCODONE HYDROCHLORIDE AND ACETAMINOPHEN 2 TABLET: 10; 325 TABLET ORAL at 15:23

## 2020-03-03 RX ADMIN — CYCLOBENZAPRINE 10 MG: 10 TABLET, FILM COATED ORAL at 15:23

## 2020-03-03 RX ADMIN — SENNOSIDES AND DOCUSATE SODIUM 1 TABLET: 8.6; 5 TABLET ORAL at 09:40

## 2020-03-03 RX ADMIN — CEFAZOLIN 3 G: 10 INJECTION, POWDER, FOR SOLUTION INTRAVENOUS at 01:34

## 2020-03-03 RX ADMIN — OXYCODONE HYDROCHLORIDE AND ACETAMINOPHEN 2 TABLET: 10; 325 TABLET ORAL at 10:40

## 2020-03-03 RX ADMIN — CYCLOBENZAPRINE 10 MG: 10 TABLET, FILM COATED ORAL at 05:45

## 2020-03-03 RX ADMIN — SODIUM CHLORIDE, PRESERVATIVE FREE 3 ML: 5 INJECTION INTRAVENOUS at 09:40

## 2020-03-03 RX ADMIN — VALSARTAN 320 MG: 320 TABLET ORAL at 09:39

## 2020-03-03 RX ADMIN — OXYCODONE HYDROCHLORIDE AND ACETAMINOPHEN 2 TABLET: 10; 325 TABLET ORAL at 05:45

## 2020-03-03 NOTE — PLAN OF CARE
Problem: Patient Care Overview  Goal: Plan of Care Review  Flowsheets (Taken 3/3/2020 0920)  Plan of Care Reviewed With: patient;spouse  Note:   Patient is a pleasant 59 y.o. male POD1 POD1 L3-5 fusion with instrumentation with laminectomy with expected post op weakness and impaired functional mobility. Patient is independent at baseline and lives at home with spouse- several steps to enter home. Today, patient performed bed mobility with Tonny, required CGA-Tonny for transfers, and ambulated 80' CGA-Tonny with a RW. Education provided regarding spinal precautions, log roll technique and brace on when up. Patient will benefit from skilled PT services acutely to address functional deficits as well as improve level of independence prior to discharge. Anticipate home upon DC.

## 2020-03-03 NOTE — PROGRESS NOTES
Discharge Planning Assessment  [facility]     Patient Name: Philip Calloway  MRN: [id number]  Today's Date: 3/3/2020    Admit Date: 3/2/2020    Discharge Needs Assessment     Row Name 03/03/20 2414       Living Environment    Lives With  spouse;child(alessandra), adult    Name(s) of Who Lives With Patient  Spouse Ariana Calloway and daughter (age 22) and son (age 18)    Current Living Arrangements  home/apartment/condo    Primary Care Provided by  self    Provides Primary Care For  no one    Family Caregiver if Needed  child(alessandra), adult;spouse    Quality of Family Relationships  helpful;involved;supportive    Able to Return to Prior Arrangements  yes       Resource/Environmental Concerns    Resource/Environmental Concerns  none       Transition Planning    Patient/Family Anticipates Transition to  home with family    Patient/Family Anticipated Services at Transition  none    Transportation Anticipated  family or friend will provide       Discharge Needs Assessment    Equipment Currently Used at Home  cane, straight    Provided Post Acute Provider List?  N/A Denies need. Plan is home         Discharge Plan     Row Name 03/03/20 1407       Plan    Plan  Home    Patient/Family in Agreement with Plan  yes    Plan Comments  Introduced self and role of CCP. Spouse, Ariana Calloway [phone](c) at bedside. Patient agreeable to discuss with all present. Patient lives with spouse and daughter (age 22) and son (age 18) in a one story house with a basement. There are 3 steps to enter with handrails on both sides. There are 10-12 steps to basement with handrail on one side and wall on the other. Patient stated he does not need to go to basement. Prior to admission, patient was independent with ADL's, worked full time and drove. For approx. one and a half months, patient has been using a straight cane at all times. Uses a walk-in shower. Has never used a HH agency nor been to an inpatient rehab facility. DC plan is to return home.  Requested commode. Msg sent to MD. Agreeable to using [facility]. Spouse will transport at [place].                Demographic Summary     Row Name 03/03/20 1406       General Information    Admission Type  observation    Arrived From  home    Reason for Consult  discharge planning    Preferred Language  English     Used During This Interaction  no        Functional Status     Row Name 03/03/20 1406       Functional Status    Usual Activity Tolerance  good    Current Activity Tolerance  good       Functional Status, IADL    Medications  independent    Meal Preparation  independent    Housekeeping  independent    Laundry  independent    Shopping  independent       Mental Status    General Appearance WDL  WDL       Employment/    Employment Status  employed full time        Psychosocial     Row Name 03/03/20 1406       Values/Beliefs    Spiritual, Cultural Beliefs, Confucianist Practices, Values that Affect Care  no       Behavior WDL    Behavior WDL  WDL       Emotion Mood WDL    Emotion/Mood/Affect WDL  WDL       Speech WDL    Speech WDL  WDL       Perceptual State WDL    Perceptual State WDL  WDL       Intellectual Performance WDL    Level of Consciousness  Alert       Coping/Stress    Sources of Support  adult child(alessandra);spouse    Reaction to Health Status  accepting;adjusting    Understanding of Condition and Treatment  adequate understanding of medical condition;adequate understanding of treatment       Developmental Stage (Eriksson's)    Developmental Stage  Stage 7 (35-65 years/Middle Adulthood) Generativity vs. Stagnation        Abuse/Neglect     Row Name 03/03/20 1407       Personal Safety    Feels Unsafe at Home or Work/School  no    Feels Threatened by Someone  no    Does Anyone Try to Keep You From Having Contact with Others or Doing Things Outside Your Home?  no    Physical Signs of Abuse Present  no                Petra Christian, RN

## 2020-03-03 NOTE — PLAN OF CARE
VSS. Tolerating po. Percocet, flexeril, and PCA for pain. Ambulated in room with 2 assist. Schreiber remains in place.       Problem: Patient Care Overview  Goal: Plan of Care Review  Outcome: Ongoing (interventions implemented as appropriate)  Flowsheets (Taken 3/3/2020 0627)  Progress: improving  Plan of Care Reviewed With: patient; family     Problem: Pain, Chronic (Adult)  Goal: Identify Related Risk Factors and Signs and Symptoms  Outcome: Ongoing (interventions implemented as appropriate)  Flowsheets (Taken 3/3/2020 0627)  Related Risk Factors (Chronic Pain): surgery  Signs and Symptoms (Chronic Pain): verbalization of pain descriptors  Goal: Acceptable Pain/Comfort Level and Functional Ability  Outcome: Ongoing (interventions implemented as appropriate)  Flowsheets (Taken 3/3/2020 0627)  Acceptable Pain/Comfort Level and Functional Ability: making progress toward outcome     Problem: Fall Risk (Adult)  Goal: Identify Related Risk Factors and Signs and Symptoms  Outcome: Ongoing (interventions implemented as appropriate)  Flowsheets (Taken 3/3/2020 0627)  Related Risk Factors (Fall Risk): neuro disease/injury  Goal: Absence of Fall  Outcome: Ongoing (interventions implemented as appropriate)  Flowsheets (Taken 3/3/2020 0627)  Absence of Fall: making progress toward outcome

## 2020-03-03 NOTE — PROGRESS NOTES
[facility] NEUROSURGERY PROGRESS NOTE      CC: POD 1 L3-4, L4-5 decompression and fusion with Dr. Bourne    Subjective     Interval History: Reports leg pain much improved, numbness tingling in legs also better.  Back pain as expected.  Has ambulated well.  Schreiber cath removed less than 1 hour ago    ROS:  MS: back pain  Neuro: Improvement in numbness tingling and leg pain      Objective     Vital signs in last 24 hours:  Temp:  [97.9 °F (36.6 °C)-98.9 °F (37.2 °C)] 98.9 °F (37.2 °C)  Heart Rate:  [62-92] 69  Resp:  [16-18] 17  BP: (117-144)/() 124/73    Intake/Output this shift:  I/O this shift:  In: -   Out: 1850 [Urine:1850]    LABS:  Results from last 7 days   Lab Units 03/03/20  0531 03/02/20  1745 02/26/20  1327   WBC 10*3/mm3  --   --  6.58   HEMOGLOBIN g/dL 11.7* 12.5* 14.3   HEMATOCRIT % 33.3* 37.0* 42.3   PLATELETS 10*3/mm3  --   --  244     Results from last 7 days   Lab Units 03/03/20  0531 02/26/20  1327   SODIUM mmol/L 141 136   POTASSIUM mmol/L 4.2 4.4   CHLORIDE mmol/L 104 99   CO2 mmol/L 26.5 27.2   BUN mg/dL 9 10   CREATININE mg/dL 0.62* 0.66*   CALCIUM mg/dL 8.2* 9.1   GLUCOSE mg/dL 132* 87       IMAGING STUDIES:  No new imaging    I personally viewed and interpreted the patient's chart.    Meds reviewed/changed: Yes    Current Facility-Administered Medications:   •  bisacodyl (DULCOLAX) suppository 10 mg, 10 mg, Rectal, Daily PRN, Jose Bourne MD  •  cyclobenzaprine (FLEXERIL) tablet 10 mg, 10 mg, Oral, TID PRN, Jose Bourne MD, 10 mg at 03/03/20 0545  •  HYDROmorphone (DILAUDID) PCA 0.2 mg/ml 50 mL syringe, , Intravenous, Continuous, Jose Bourne MD  •  lactated ringers infusion, 9 mL/hr, Intravenous, Continuous, Dewey Davis MD, Last Rate: 9 mL/hr at 03/02/20 0620  •  LORazepam (ATIVAN) tablet 1 mg, 1 mg, Oral, BID PRN, Jose Bourne MD  •  naloxone (NARCAN) injection 0.1 mg, 0.1 mg, Intravenous, Q5 Min PRN, Jose Bourne MD  •  ondansetron (ZOFRAN) tablet 4  mg, 4 mg, Oral, Q6H PRN **OR** ondansetron (ZOFRAN) injection 4 mg, 4 mg, Intravenous, Q6H PRN, Jose Bourne MD  •  oxyCODONE-acetaminophen (PERCOCET)  MG per tablet 2 tablet, 2 tablet, Oral, Q4H PRN, Jose Bourne MD, 2 tablet at 03/03/20 1040  •  polyethylene glycol 3350 powder (packet), 17 g, Oral, Daily, Jose Bourne MD, 17 g at 03/03/20 0940  •  senna-docusate sodium (SENOKOT-S) 8.6-50 MG tablet 1 tablet, 1 tablet, Oral, BID, Jose Bourne MD, 1 tablet at 03/03/20 0940  •  sodium chloride 0.9 % flush 10 mL, 10 mL, Intravenous, PRN, Jose Bourne MD  •  sodium chloride 0.9 % flush 3 mL, 3 mL, Intravenous, Q12H, Jose Bourne MD, 3 mL at 03/03/20 0940  •  sodium chloride 0.9 % infusion, 100 mL/hr, Intravenous, Continuous, Jose Bourne MD, Last Rate: 100 mL/hr at 03/03/20 1029, 100 mL/hr at 03/03/20 1029  •  temazepam (RESTORIL) capsule 15 mg, 15 mg, Oral, Nightly PRN, Jose Bourne MD  •  valsartan (DIOVAN) tablet 320 mg, 320 mg, Oral, Daily, Jose Bourne MD, 320 mg at 03/03/20 0939      Physical Exam:    General:   Awake, alert, up in chair  Back:    LSO brace On.  Straight leg raise results in back pain  Motor: Pain limiting iliopsoas weakness bilaterally-causes back pain.  Otherwise full strength bilateral lower extremities  Sensation: Normal to light touch  Station and Gait:             PT note bed mobility with Tonny, required CGA-Tonny for transfers, and ambulated 80' CGA-Tonny with a RW  Extremities:   No calf tenderness or swelling    Assessment/Plan     ASSESSMENT:      Spondylolisthesis of lumbar region    Spinal stenosis of lumbar region with neurogenic claudication      PLAN: Postop day 1 from 2 level lumbar decompression and fusion.  Leg pain much better.  Back pain as expected.  Ambulatory.  Schreiber just removed.  Will follow for urinary control.  Has pain limiting weakness iliopsoas, but otherwise appears to be full strength.  Did well with PT.  Will follow.  " Brace use, activity restrictions, pain management per . CBC in AM    I discussed the patients findings and my recommendations with patient, family and Dr. hurtado       LOS: 0 days       Tg Contreras, APRN  3/3/2020  11:48 AM    \"Dictated utilizing Dragon dictation\".      "

## 2020-03-03 NOTE — THERAPY EVALUATION
Patient Name: Philip Calloway  : 1960    MRN: [id number]                              Today's Date: 3/3/2020       Admit Date: 3/2/2020    Visit Dx:     ICD-10-CM ICD-9-CM   1. Spondylolisthesis of lumbar region M43.16 738.4   2. Spinal stenosis of lumbar region with neurogenic claudication M48.062 724.03     Patient Active Problem List   Diagnosis   • Lumbar radiculopathy   • Spondylolisthesis of lumbar region   • Spinal stenosis of lumbar region with neurogenic claudication     Past Medical History:   Diagnosis Date   • Anxiety    • Arthritis     OSTEO   • At risk for obstructive sleep apnea     STOP BANG 7   • Balance problem     SINCE BACK PROBLEMS   • Cataract     NOT RIPE   • History of migraine    • History of palpitations    • Hypertension    • Low back pain    • Lumbar disc disease     L3-L5   • McArdle's disease (CMS/HCC)     HISTORY OF. HAS NOT HAD ANY ISSUES WITH ANESTHESIA IN THE PAST   • Numbness and tingling     TEMO LEGS   • PONV (postoperative nausea and vomiting)    • Spinal stenosis of lumbar region with neurogenic claudication    • Vision disorder     LEGALLY BLIND LEFT EYE. CONGENITAL     Past Surgical History:   Procedure Laterality Date   • BACK SURGERY  1998   • EPIDURAL BLOCK     • HERNIA REPAIR  2016   • SPINE SURGERY       General Information     Row Name 20 0921          PT Evaluation Time/Intention    Document Type  evaluation  -MS     Mode of Treatment  physical therapy  -MS     Row Name 20          General Information    Patient Profile Reviewed?  yes  -MS     Prior Level of Function  independent:;all household mobility use of SPC and RW  -MS     Existing Precautions/Restrictions  fall;brace worn when out of bed;spinal  -MS     Barriers to Rehab  none identified  -MS     Row Name 20          Relationship/Environment    Lives With  spouse;child(alessandra), adult  -MS     Row Name 2021          Resource/Environmental Concerns    Current Living  Arrangements  home/apartment/condo  -MS     Row Name 03/03/20 0921          Home Main Entrance    Number of Stairs, Main Entrance  three  -MS     Stair Railings, Main Entrance  railings safe and in good condition  -MS     Row Name 03/03/20 0921          Cognitive Assessment/Intervention- PT/OT    Orientation Status (Cognition)  oriented x 4  -MS     Row Name 03/03/20 0921          Safety Issues, Functional Mobility    Safety Issues Affecting Function (Mobility)  insight into deficits/self awareness;sequencing abilities;positioning of assistive device  -MS     Impairments Affecting Function (Mobility)  pain;postural/trunk control;endurance/activity tolerance;strength;balance;range of motion (ROM)  -MS       User Key  (r) = Recorded By, (t) = Taken By, (c) = Cosigned By    Initials Name Provider Type    MS YogeshMaureen, PT Physical Therapist        Mobility     Row Name 03/03/20 0922          Bed Mobility Assessment/Treatment    Bed Mobility Assessment/Treatment  supine-sit  -MS     Supine-Sit [place] (Bed Mobility)  verbal cues;nonverbal cues (demo/gesture);minimum assist (75% patient effort)  -MS     Assistive Device (Bed Mobility)  bed rails;head of bed elevated  -MS     Comment (Bed Mobility)  Cues for log roll technique.  -MS     Row Name 03/03/20 0922          Transfer Assessment/Treatment    Comment (Transfers)  Posterior lean initially but corrected with cues.  -MS     Row Name 03/03/20 0922          Sit-Stand Transfer    Sit-Stand [place] (Transfers)  contact guard;minimum assist (75% patient effort);verbal cues;nonverbal cues (demo/gesture)  -MS     Assistive Device (Sit-Stand Transfers)  walker, front-wheeled  -MS     Row Name 03/03/20 0922          Gait/Stairs Assessment/Training    Gait/Stairs Assessment/Training  gait/ambulation independence  -MS     [place] Level (Gait)  contact guard;minimum assist (75% patient effort);verbal cues;nonverbal cues (demo/gesture)  -MS     Assistive  Device (Gait)  walker, front-wheeled  -MS     Distance in Feet (Gait)  80  -MS     Deviations/Abnormal Patterns (Gait)  avery decreased;gait speed decreased;ataxic  -MS     Comment (Gait/Stairs)  Limited due to pain.  -MS       User Key  (r) = Recorded By, (t) = Taken By, (c) = Cosigned By    Initials Name Provider Type    MS TrevinosMaureen, PT Physical Therapist        Obj/Interventions    No documentation.       Goals/Plan     Row Name 03/03/20 0955          Bed Mobility Goal 1 (PT)    Activity/Assistive Device (Bed Mobility Goal 1, PT)  bed mobility activities, all  -MS     [place] Level/Cues Needed (Bed Mobility Goal 1, PT)  supervision required  -MS     Time Frame (Bed Mobility Goal 1, PT)  1 week  -MS     Row Name 03/03/20 0955          Transfer Goal 1 (PT)    Activity/Assistive Device (Transfer Goal 1, PT)  transfers, all;walker, rolling  -MS     [place] Level/Cues Needed (Transfer Goal 1, PT)  supervision required  -MS     Time Frame (Transfer Goal 1, PT)  1 week  -MS     Row Name 03/03/20 0955          Gait Training Goal 1 (PT)    Activity/Assistive Device (Gait Training Goal 1, PT)  gait (walking locomotion);walker, rolling  -MS     [place] Level (Gait Training Goal 1, PT)  supervision required  -MS     Distance (Gait Goal 1, PT)  150  -MS     Time Frame (Gait Training Goal 1, PT)  1 week  -MS     Row Name 03/03/20 0955          Stairs Goal 1 (PT)    Activity/Assistive Device (Stairs Goal 1, PT)  stairs, all skills  -MS     [place] Level/Cues Needed (Stairs Goal 1, PT)  contact guard assist  -MS     Number of Stairs (Stairs Goal 1, PT)  4  -MS     Time Frame (Stairs Goal 1, PT)  1 week  -MS       User Key  (r) = Recorded By, (t) = Taken By, (c) = Cosigned By    Initials Name Provider Type    Maureen Brand PT Physical Therapist        Clinical Impression     Row Name 03/03/20 0919          Pain Assessment    Additional Documentation  Pain Scale: Numbers Pre/Post-Treatment  (Group)  -MS     Row Name 03/03/20 0953          Pain Scale: Numbers Pre/Post-Treatment    Pain Scale: Numbers, Pretreatment  8/10  -MS     Pain Scale: Numbers, Post-Treatment  8/10  -MS     Pain Location - Orientation  lower  -MS     Pain Location  back  -MS     Pain Intervention(s)  Repositioned;Ambulation/increased activity;Rest  -MS     Row Name 03/03/20 0953          Plan of Care Review    Plan of Care Reviewed With  patient;spouse  -MS     Row Name 03/03/20 0953          Physical Therapy Clinical Impression    Patient/Family Goals Statement (PT Clinical Impression)  POD1 L3-5 fusion with instrumentation with laminectomy  -MS     Criteria for Skilled Interventions Met (PT Clinical Impression)  yes;treatment indicated  -MS     Rehab Potential (PT Clinical Summary)  good, to achieve stated therapy goals  -MS     Row Name 03/03/20 0953          Positioning and Restraints    Pre-Treatment Position  in bed  -MS     Post Treatment Position  chair  -MS     In Chair  sitting;call light within reach;encouraged to call for assist;exit alarm on;legs elevated  -MS       User Key  (r) = Recorded By, (t) = Taken By, (c) = Cosigned By    Initials Name Provider Type    MS YogeshMaureen, PT Physical Therapist        Outcome Measures     Row Name 03/03/20 0956          How much help from another person do you currently need...    Turning from your back to your side while in flat bed without using bedrails?  3  -MS     Moving from lying on back to sitting on the side of a flat bed without bedrails?  2  -MS     Moving to and from a bed to a chair (including a wheelchair)?  3  -MS     Standing up from a chair using your arms (e.g., wheelchair, bedside chair)?  3  -MS     Climbing 3-5 steps with a railing?  2  -MS     To walk in hospital room?  3  -MS     AM-PAC 6 Clicks Score (PT)  16  -MS     Row Name 03/03/20 0956          Functional Assessment    Outcome Measure Options  AM-PAC 6 Clicks Basic Mobility (PT)  -MS       User  Key  (r) = Recorded By, (t) = Taken By, (c) = Cosigned By    Initials Name Provider Type    MS ZuñigaMaureen, PT Physical Therapist          PT Recommendation and Plan  Planned Therapy Interventions (PT Eval): balance training, bed mobility training, gait training, home exercise program, patient/family education, postural re-education, stair training, strengthening, transfer training  Outcome Summary/Treatment Plan (PT)  Anticipated Equipment Needs at Discharge (PT): front wheeled walker(pending patient progress)  Anticipated Discharge Disposition (PT): home with assist  Plan of Care Reviewed With: patient, spouse     Time Calculation:   PT Charges     Row Name 03/03/20 0920             Time Calculation    Start Time  0849  -MS      Stop Time  0910  -MS      Time Calculation (min)  21 min  -MS      PT Received On  03/03/20  -MS      PT - Next Appointment  03/04/20  -MS      PT Goal Re-Cert Due Date  03/10/20  -MS         Time Calculation- PT    Total Timed Code Minutes- PT  14 minute(s)  -MS        User Key  (r) = Recorded By, (t) = Taken By, (c) = Cosigned By    Initials Name Provider Type    MS Zuñiga Maureen BLUNT PT Physical Therapist        Therapy Charges for Today     Code Description Service Date Service Provider Modifiers Qty    77319429443 HC PT EVAL MOD COMPLEXITY 2 3/3/2020 Maureen Zuñiga, PT GP 1    39964455696 HC PT THER PROC EA 15 MIN 3/3/2020 Maureen Zuñiga, PT GP 1    81214946273 HC PT THER SUPP EA 15 MIN 3/3/2020 Maureen Zuñiga, PT GP 1          PT G-Codes  Outcome Measure Options: AM-PAC 6 Clicks Basic Mobility (PT)  AM-PAC 6 Clicks Score (PT): 16    Maureen Zuñiga, PT  3/3/2020

## 2020-03-03 NOTE — PROGRESS NOTES
AVSS.  Complains of back pain, not unexpected.  Leg pain is better.  Moves feet well.  Hemoglobin 7.7 plan PT, mobilize, watch for ileus as he already looks a little bloated.

## 2020-03-04 LAB
BASOPHILS # BLD AUTO: 0.03 10*3/MM3 (ref 0–0.2)
BASOPHILS NFR BLD AUTO: 0.3 % (ref 0–1.5)
DEPRECATED RDW RBC AUTO: 40 FL (ref 37–54)
EOSINOPHIL # BLD AUTO: 0.32 10*3/MM3 (ref 0–0.4)
EOSINOPHIL NFR BLD AUTO: 3 % (ref 0.3–6.2)
ERYTHROCYTE [DISTWIDTH] IN BLOOD BY AUTOMATED COUNT: 12.5 % (ref 12.3–15.4)
HCT VFR BLD AUTO: 32.3 % (ref 37.5–51)
HGB BLD-MCNC: 10.7 G/DL (ref 13–17.7)
IMM GRANULOCYTES # BLD AUTO: 0.04 10*3/MM3 (ref 0–0.05)
IMM GRANULOCYTES NFR BLD AUTO: 0.4 % (ref 0–0.5)
LYMPHOCYTES # BLD AUTO: 2.04 10*3/MM3 (ref 0.7–3.1)
LYMPHOCYTES NFR BLD AUTO: 19.2 % (ref 19.6–45.3)
MCH RBC QN AUTO: 28.9 PG (ref 26.6–33)
MCHC RBC AUTO-ENTMCNC: 33.1 G/DL (ref 31.5–35.7)
MCV RBC AUTO: 87.3 FL (ref 79–97)
MONOCYTES # BLD AUTO: 1.22 10*3/MM3 (ref 0.1–0.9)
MONOCYTES NFR BLD AUTO: 11.5 % (ref 5–12)
NEUTROPHILS # BLD AUTO: 6.97 10*3/MM3 (ref 1.7–7)
NEUTROPHILS NFR BLD AUTO: 65.6 % (ref 42.7–76)
NRBC BLD AUTO-RTO: 0 /100 WBC (ref 0–0.2)
PLATELET # BLD AUTO: 173 10*3/MM3 (ref 140–450)
PMV BLD AUTO: 9.6 FL (ref 6–12)
RBC # BLD AUTO: 3.7 10*6/MM3 (ref 4.14–5.8)
WBC NRBC COR # BLD: 10.62 10*3/MM3 (ref 3.4–10.8)

## 2020-03-04 PROCEDURE — 51701 INSERT BLADDER CATHETER: CPT

## 2020-03-04 PROCEDURE — 99024 POSTOP FOLLOW-UP VISIT: CPT | Performed by: PHYSICIAN ASSISTANT

## 2020-03-04 PROCEDURE — G0378 HOSPITAL OBSERVATION PER HR: HCPCS

## 2020-03-04 PROCEDURE — 99024 POSTOP FOLLOW-UP VISIT: CPT | Performed by: ORTHOPAEDIC SURGERY

## 2020-03-04 PROCEDURE — 85025 COMPLETE CBC W/AUTO DIFF WBC: CPT | Performed by: NURSE PRACTITIONER

## 2020-03-04 PROCEDURE — 97110 THERAPEUTIC EXERCISES: CPT

## 2020-03-04 RX ORDER — SUMATRIPTAN 100 MG/1
50 TABLET, FILM COATED ORAL DAILY PRN
Status: DISCONTINUED | OUTPATIENT
Start: 2020-03-04 | End: 2020-03-05 | Stop reason: HOSPADM

## 2020-03-04 RX ORDER — TAMSULOSIN HYDROCHLORIDE 0.4 MG/1
0.4 CAPSULE ORAL DAILY
Status: DISCONTINUED | OUTPATIENT
Start: 2020-03-04 | End: 2020-03-05 | Stop reason: HOSPADM

## 2020-03-04 RX ADMIN — OXYCODONE HYDROCHLORIDE AND ACETAMINOPHEN 2 TABLET: 10; 325 TABLET ORAL at 10:59

## 2020-03-04 RX ADMIN — SODIUM CHLORIDE, PRESERVATIVE FREE 3 ML: 5 INJECTION INTRAVENOUS at 22:02

## 2020-03-04 RX ADMIN — OXYCODONE HYDROCHLORIDE AND ACETAMINOPHEN 2 TABLET: 10; 325 TABLET ORAL at 15:28

## 2020-03-04 RX ADMIN — SODIUM CHLORIDE, PRESERVATIVE FREE 3 ML: 5 INJECTION INTRAVENOUS at 09:23

## 2020-03-04 RX ADMIN — SENNOSIDES AND DOCUSATE SODIUM 1 TABLET: 8.6; 5 TABLET ORAL at 09:02

## 2020-03-04 RX ADMIN — SODIUM CHLORIDE 100 ML/HR: 9 INJECTION, SOLUTION INTRAVENOUS at 04:23

## 2020-03-04 RX ADMIN — POLYETHYLENE GLYCOL 3350 17 G: 17 POWDER, FOR SOLUTION ORAL at 09:02

## 2020-03-04 RX ADMIN — SENNOSIDES AND DOCUSATE SODIUM 1 TABLET: 8.6; 5 TABLET ORAL at 22:01

## 2020-03-04 RX ADMIN — TAMSULOSIN HYDROCHLORIDE 0.4 MG: 0.4 CAPSULE ORAL at 18:12

## 2020-03-04 RX ADMIN — VALSARTAN 320 MG: 320 TABLET ORAL at 09:02

## 2020-03-04 RX ADMIN — CYCLOBENZAPRINE 10 MG: 10 TABLET, FILM COATED ORAL at 11:00

## 2020-03-04 RX ADMIN — OXYCODONE HYDROCHLORIDE AND ACETAMINOPHEN 2 TABLET: 10; 325 TABLET ORAL at 20:07

## 2020-03-04 RX ADMIN — OXYCODONE HYDROCHLORIDE AND ACETAMINOPHEN 2 TABLET: 10; 325 TABLET ORAL at 04:26

## 2020-03-04 NOTE — THERAPY TREATMENT NOTE
Patient Name: Philip Calloway  : 1960    MRN: [id number]                              Today's Date: 3/4/2020       Admit Date: 3/2/2020    Visit Dx:     ICD-10-CM ICD-9-CM   1. Spondylolisthesis of lumbar region M43.16 738.4   2. Spinal stenosis of lumbar region with neurogenic claudication M48.062 724.03     Patient Active Problem List   Diagnosis   • Lumbar radiculopathy   • Spondylolisthesis of lumbar region   • Spinal stenosis of lumbar region with neurogenic claudication     Past Medical History:   Diagnosis Date   • Anxiety    • Arthritis     OSTEO   • At risk for obstructive sleep apnea     STOP BANG 7   • Balance problem     SINCE BACK PROBLEMS   • Cataract     NOT RIPE   • History of migraine    • History of palpitations    • Hypertension    • Low back pain    • Lumbar disc disease     L3-L5   • McArdle's disease (CMS/HCC)     HISTORY OF. HAS NOT HAD ANY ISSUES WITH ANESTHESIA IN THE PAST   • Numbness and tingling     TEMO LEGS   • PONV (postoperative nausea and vomiting)    • Spinal stenosis of lumbar region with neurogenic claudication    • Vision disorder     LEGALLY BLIND LEFT EYE. CONGENITAL     Past Surgical History:   Procedure Laterality Date   • BACK SURGERY  1998   • EPIDURAL BLOCK     • HERNIA REPAIR  2016   • SPINE SURGERY       General Information     Row Name 20 0939          PT Evaluation Time/Intention    Document Type  therapy note (daily note)  -MS     Mode of Treatment  physical therapy  -MS     Row Name 20 0939          General Information    Existing Precautions/Restrictions  fall;brace worn when out of bed;spinal  -MS     Row Name 20 0939          Cognitive Assessment/Intervention- PT/OT    Orientation Status (Cognition)  oriented x 4  -MS     Row Name 20 0939          Safety Issues, Functional Mobility    Impairments Affecting Function (Mobility)  pain;postural/trunk control;endurance/activity tolerance;strength;balance;range of motion (ROM)  -MS        User Key  (r) = Recorded By, (t) = Taken By, (c) = Cosigned By    Initials Name Provider Type    Maureen Brand, PT Physical Therapist        Mobility     Row Name 03/04/20 0939          Bed Mobility Assessment/Treatment    Supine-Sit [place] (Bed Mobility)  contact guard;verbal cues;nonverbal cues (demo/gesture)  -MS     Assistive Device (Bed Mobility)  bed rails;head of bed elevated  -MS     Comment (Bed Mobility)  Less assist required for bed mobility today.  -MS     Row Name 03/04/20 0939          Transfer Assessment/Treatment    Comment (Transfers)  Improved balance today with standing. Bed height incr for mechanical advantage  -MS     Row Name 03/04/20 0939          Sit-Stand Transfer    Sit-Stand [place] (Transfers)  contact guard;verbal cues;nonverbal cues (demo/gesture);minimum assist (75% patient effort)  -MS     Assistive Device (Sit-Stand Transfers)  walker, front-wheeled  -MS     Row Name 03/04/20 0939          Gait/Stairs Assessment/Training    Gait/Stairs Assessment/Training  gait/ambulation independence  -MS     [place] Level (Gait)  contact guard;minimum assist (75% patient effort);verbal cues;nonverbal cues (demo/gesture)  -MS     Assistive Device (Gait)  walker, front-wheeled  -MS     Distance in Feet (Gait)  100  -MS     Deviations/Abnormal Patterns (Gait)  avery decreased;gait speed decreased  -MS     Bilateral Gait Deviations  knee buckling, bilateral  -MS     Comment (Gait/Stairs)  Increased assist required within last 50' due to fatigue and knees becoming weak. 9/10 pain reported.  -MS       User Key  (r) = Recorded By, (t) = Taken By, (c) = Cosigned By    Initials Name Provider Type    Maureen Brand, PT Physical Therapist        Obj/Interventions     Row Name 03/04/20 1012          Therapeutic Exercise    Comment (Therapeutic Exercise)  x 10 reps LAQ, incr time required  -MS     Row Name 03/04/20 1012          Static Sitting Balance    Level of  [place] (Unsupported Sitting, Static Balance)  supervision  -MS     Sitting Position (Unsupported Sitting, Static Balance)  sitting on edge of bed  -MS     Row Name 03/04/20 1012          Static Standing Balance    Level of [place] (Supported Standing, Static Balance)  standby assist;contact guard assist  -MS     Assistive Device Utilized (Supported Standing, Static Balance)  walker, rolling  -MS     Row Name 03/04/20 1012          Sensory Assessment/Intervention    Sensory General Assessment  no sensation deficits identified  -MS       User Key  (r) = Recorded By, (t) = Taken By, (c) = Cosigned By    Initials Name Provider Type    Maureen Brand, PT Physical Therapist        Goals/Plan    No documentation.       Clinical Impression     Row Name 03/04/20 1013          Pain Scale: Numbers Pre/Post-Treatment    Pain Scale: Numbers, Pretreatment  9/10  -MS     Pain Scale: Numbers, Post-Treatment  9/10  -MS     Pain Location - Orientation  lower  -MS     Pain Location  back  -MS     Pain Intervention(s)  Repositioned;Ambulation/increased activity;Rest  -MS     Row Name 03/04/20 1013          Plan of Care Review    Plan of Care Reviewed With  patient;spouse  -MS     Outcome Summary  Progressed ambulation distance to 100' CGA with a RW today- further distance limited due to weakness and pain level. Encouraged patient to ambulate with nsg. Will continue to advance as able.  -MS     Row Name 03/04/20 1013          Positioning and Restraints    Pre-Treatment Position  in bed  -MS     Post Treatment Position  chair  -MS     In Chair  sitting;call light within reach;encouraged to call for assist;exit alarm on;legs elevated  -MS       User Key  (r) = Recorded By, (t) = Taken By, (c) = Cosigned By    Initials Name Provider Type    Maureen Brand, PT Physical Therapist        Outcome Measures     Row Name 03/04/20 1014          How much help from another person do you currently need...    Turning from your  back to your side while in flat bed without using bedrails?  3  -MS     Moving from lying on back to sitting on the side of a flat bed without bedrails?  2  -MS     Moving to and from a bed to a chair (including a wheelchair)?  3  -MS     Standing up from a chair using your arms (e.g., wheelchair, bedside chair)?  3  -MS     Climbing 3-5 steps with a railing?  2  -MS     To walk in hospital room?  3  -MS     AM-PAC 6 Clicks Score (PT)  16  -MS       User Key  (r) = Recorded By, (t) = Taken By, (c) = Cosigned By    Initials Name Provider Type    MS TrevinosMaureen, PT Physical Therapist          PT Recommendation and Plan  Planned Therapy Interventions (PT Eval): balance training, bed mobility training, gait training, home exercise program, patient/family education, postural re-education, stair training, strengthening, transfer training  Outcome Summary/Treatment Plan (PT)  Anticipated Equipment Needs at Discharge (PT): front wheeled walker(pending patient progress)  Anticipated Discharge Disposition (PT): home with assist  Plan of Care Reviewed With: patient, spouse  Outcome Summary: Progressed ambulation distance to 100' CGA with a RW today- further distance limited due to weakness and pain level. Encouraged patient to ambulate with nsg. Will continue to advance as able.     Time Calculation:   PT Charges     Row Name 03/04/20 0938             Time Calculation    Start Time  0846  -MS      Stop Time  0906  -MS      Time Calculation (min)  20 min  -MS      PT Received On  03/04/20  -MS      PT - Next Appointment  03/05/20  -MS        User Key  (r) = Recorded By, (t) = Taken By, (c) = Cosigned By    Initials Name Provider Type    Maureen Brand PT Physical Therapist        Therapy Charges for Today     Code Description Service Date Service Provider Modifiers Qty    51295137948 HC PT EVAL MOD COMPLEXITY 2 3/3/2020 Maureen Zuñiga, PT GP 1    90756819048 HC PT THER PROC EA 15 MIN 3/3/2020 Maureen Zuñiga  MERLENE, PT GP 1    [id number]  PT THER SUPP EA 15 MIN 3/3/2020 Zuñiga, Maureen BLUNT, PT GP 1    59617554722 HC PT THER PROC EA 15 MIN 3/4/2020 ZuñigaMaureen, PT GP 1          PT G-Codes  Outcome Measure Options: AM-PAC 6 Clicks Basic Mobility (PT)  AM-PAC 6 Clicks Score (PT): 16    Maureen Zuñiga, PT  3/4/2020

## 2020-03-04 NOTE — PROGRESS NOTES
"Doing well. Sitting in a chair. Walked with PT. Voiding ok but at times not sure if he is emptying well.       Blood pressure 160/86, pulse 95, temperature 100.2 °F (37.9 °C), temperature source Oral, resp. rate 19, height 182.9 cm (72\"), weight 121 kg (267 lb 10.2 oz), SpO2 92 %.      AA, Ox3  Incision ok  LOVE well  Wearing brace      ..  Results from last 7 days   Lab Units 03/04/20  0509   WBC 10*3/mm3 10.62   HEMOGLOBIN g/dL 10.7*   HEMATOCRIT % 32.3*   PLATELETS 10*3/mm3 173         POD#2 s/p L3-5 lami and fusion    Asked RN to do post void bladder scan, call if > 250cc  Mobilize  D/c PCA  Home per Dr. Bourne  "

## 2020-03-04 NOTE — PROGRESS NOTES
Orthopedic Progress Note      Patient: Philip Calloway    YOB: 1960    Medical Record Number: [id number]    Attending Physician: Jose Bourne MD    Date of Admission: 3/2/2020  5:39 AM    Admitting Dx:  Spondylolisthesis of lumbar region [M43.16]  Spinal stenosis of lumbar region with neurogenic claudication [M48.062]  Spondylolisthesis of lumbar region [M43.16]  Spondylolisthesis of lumbar region [M43.16]    Status Post: Lumbar 3 to lumbar 5 fusion with instrumentation with laminectomy by Dr. Miranda    Post Operative Day Number: 2    Current Problem List:   Patient Active Problem List   Diagnosis   • Lumbar radiculopathy   • Spondylolisthesis of lumbar region   • Spinal stenosis of lumbar region with neurogenic claudication         Past Medical History:   Diagnosis Date   • Anxiety    • Arthritis     OSTEO   • At risk for obstructive sleep apnea     STOP BANG 7   • Balance problem     SINCE BACK PROBLEMS   • Cataract     NOT R[place]   • History of migraine    • History of palpitations    • Hypertension    • Low back pain    • Lumbar disc disease     L3-L5   • McArdle's disease (CMS/HCC)     HISTORY OF. HAS NOT HAD ANY ISSUES WITH ANESTHESIA IN THE PAST   • Numbness and tingling     TEMO LEGS   • PONV (postoperative nausea and vomiting)    • Spinal stenosis of lumbar region with neurogenic claudication    • Vision disorder     LEGALLY BLIND LEFT EYE. CONGENITAL       Current Medications:  Scheduled Meds:  polyethylene glycol 17 g Oral Daily   senna-docusate sodium 1 tablet Oral BID   sodium chloride 3 mL Intravenous Q12H   valsartan 320 mg Oral Daily     PRN Meds:.•  bisacodyl  •  cyclobenzaprine  •  LORazepam  •  naloxone  •  ondansetron **OR** ondansetron  •  oxyCODONE-acetaminophen  •  sodium chloride  •  SUMAtriptan  •  temazepam    SUBJECTIVE: 59 y.o.  male.  Awake and alert.  Complaining of migraine headache.    OBJECTIVE:   Vitals:    03/03/20 2103 03/04/20 0427 03/04/20 0800 03/04/20 0942      BP: 145/85 135/78  154/91   BP Location: Left arm Left arm  Left arm   Patient Position: Lying Lying  Sitting   Pulse: 94 80  77   Resp: 17 17 16 17   Temp: 98.5 °F (36.9 °C) 100.5 °F (38.1 °C) 98.8 °F (37.1 °C) 99.9 °F (37.7 °C)   TempSrc: Oral Oral Oral Oral   SpO2: 95% 95%  92%   Weight:       Height:         I/O last 3 completed shifts:  In: 3931.7 [I.V.:3931.7]  Out: 3950 [Urine:3950]    Diagnostic Tests:   Lab Results (last 24 hours)     Procedure Component Value Units Date/Time    CBC & Differential [[id number]] Collected:  03/04/20 0509    Specimen:  Blood Updated:  03/04/20 0549    Narrative:       The following orders were created for panel order CBC & Differential.  Procedure                               Abnormality         Status                     ---------                               -----------         ------                     CBC Auto Differential[[id number]]        Abnormal            Final result                 Please view results for these tests on the individual orders.    CBC Auto Differential [[id number]]  (Abnormal) Collected:  03/04/20 0509    Specimen:  Blood Updated:  03/04/20 0549     WBC 10.62 10*3/mm3      RBC 3.70 10*6/mm3      Hemoglobin 10.7 g/dL      Hematocrit 32.3 %      MCV 87.3 fL      MCH 28.9 pg      MCHC 33.1 g/dL      RDW 12.5 %      RDW-SD 40.0 fl      MPV 9.6 fL      Platelets 173 10*3/mm3      Neutrophil % 65.6 %      Lymphocyte % 19.2 %      Monocyte % 11.5 %      Eosinophil % 3.0 %      Basophil % 0.3 %      Immature Grans % 0.4 %      Neutrophils, Absolute 6.97 10*3/mm3      Lymphocytes, Absolute 2.04 10*3/mm3      Monocytes, Absolute 1.22 10*3/mm3      Eosinophils, Absolute 0.32 10*3/mm3      Basophils, Absolute 0.03 10*3/mm3      Immature Grans, Absolute 0.04 10*3/mm3      nRBC 0.0 /100 WBC           PHYSICAL EXAM: Back dressing remains dry and intact.  Being legs well.  Denies any leg pain.  Does still have some intermittent tingling in his right lower  extremity.  Strength is equal bilaterally.  Complains of a right sided headache.  States that he has migraines and this feels the same as when he has a migraine.  Patient is progressing well with physical therapy.  Voiding well.  Hemoglobin 10.7 white blood cell count 10.62.    ASSESSMENT & PLAN:    DC PCA  Patient takes Imitrex 50 mg as needed for migraine headache.  Increase p.o. fluid intake especially caffeine.  Continue to mobilize with PT.  Passing gas but no BM.  Plan home possibly tomorrow      Date: 3/4/2020    WARD Hernández MD

## 2020-03-04 NOTE — PLAN OF CARE
Problem: Patient Care Overview  Goal: Plan of Care Review  Flowsheets (Taken 3/4/2020 1013)  Plan of Care Reviewed With: patient;spouse  Outcome Summary: Progressed ambulation distance to 100' CGA with a RW today- further distance limited due to weakness and pain level. Encouraged patient to ambulate with nsg. Will continue to advance as able.

## 2020-03-04 NOTE — THERAPY TREATMENT NOTE
Patient Name: Philip Calloway  : 1960    MRN: [id number]                              Today's Date: 3/4/2020       Admit Date: 3/2/2020    Visit Dx:     ICD-10-CM ICD-9-CM   1. Lumbar radiculopathy M54.16 724.4   2. Spondylolisthesis of lumbar region M43.16 738.4   3. Spinal stenosis of lumbar region with neurogenic claudication M48.062 724.03     Patient Active Problem List   Diagnosis   • Lumbar radiculopathy   • Spondylolisthesis of lumbar region   • Spinal stenosis of lumbar region with neurogenic claudication     Past Medical History:   Diagnosis Date   • Anxiety    • Arthritis     OSTEO   • At risk for obstructive sleep apnea     STOP BANG 7   • Balance problem     SINCE BACK PROBLEMS   • Cataract     NOT R[place]   • History of migraine    • History of palpitations    • Hypertension    • Low back pain    • Lumbar disc disease     L3-L5   • McArdle's disease (CMS/HCC)     HISTORY OF. HAS NOT HAD ANY ISSUES WITH ANESTHESIA IN THE PAST   • Numbness and tingling     TEMO LEGS   • PONV (postoperative nausea and vomiting)    • Spinal stenosis of lumbar region with neurogenic claudication    • Vision disorder     LEGALLY BLIND LEFT EYE. CONGENITAL     Past Surgical History:   Procedure Laterality Date   • BACK SURGERY  1998   • EPIDURAL BLOCK     • HERNIA REPAIR  2016   • SPINE SURGERY       General Information     Row Name 20 1624 20 0939       PT Evaluation Time/Intention    Document Type  therapy note (daily note)  -MS  therapy note (daily note)  -MS    Mode of Treatment  physical therapy  -MS  physical therapy  -MS    Row Name 20 1624 20 0939       General Information    Existing Precautions/Restrictions  fall;brace worn when out of bed;spinal  -MS  fall;brace worn when out of bed;spinal  -MS    Row Name 20 1624 20 0939       Cognitive Assessment/Intervention- PT/OT    Orientation Status (Cognition)  oriented x 4  -MS  oriented x 4  -MS    Row Name 20 5757  03/04/20 0939       Safety Issues, Functional Mobility    Impairments Affecting Function (Mobility)  pain;postural/trunk control;endurance/activity tolerance;strength;balance;range of motion (ROM)  -MS  pain;postural/trunk control;endurance/activity tolerance;strength;balance;range of motion (ROM)  -MS      User Key  (r) = Recorded By, (t) = Taken By, (c) = Cosigned By    Initials Name Provider Type    MS ZuñigaMaureen, PT Physical Therapist        Mobility     Row Name 03/04/20 1624 03/04/20 0939       Bed Mobility Assessment/Treatment    Supine-Sit [place] (Bed Mobility)  contact guard;verbal cues;nonverbal cues (demo/gesture)  -MS  contact guard;verbal cues;nonverbal cues (demo/gesture)  -MS    Assistive Device (Bed Mobility)  bed rails;head of bed elevated  -MS  bed rails;head of bed elevated  -MS    Comment (Bed Mobility)  Improvement with log roll technique.  -MS  Less assist required for bed mobility today.  -MS    Row Name 03/04/20 0939          Transfer Assessment/Treatment    Comment (Transfers)  Improved balance today with standing. Bed height incr for mechanical advantage  -MS     Row Name 03/04/20 1624 03/04/20 0939       Sit-Stand Transfer    Sit-Stand [place] (Transfers)  contact guard;verbal cues;nonverbal cues (demo/gesture)  -MS  contact guard;verbal cues;nonverbal cues (demo/gesture);minimum assist (75% patient effort)  -MS    Assistive Device (Sit-Stand Transfers)  walker, front-wheeled  -MS  walker, front-wheeled  -MS    Row Name 03/04/20 1624 03/04/20 0939       Gait/Stairs Assessment/Training    Gait/Stairs Assessment/Training  gait/ambulation independence  -MS  gait/ambulation independence  -MS    [place] Level (Gait)  contact guard;verbal cues;nonverbal cues (demo/gesture)  -MS  contact guard;minimum assist (75% patient effort);verbal cues;nonverbal cues (demo/gesture)  -MS    Assistive Device (Gait)  walker, front-wheeled  -MS  walker, front-wheeled  -MS    Distance in  Feet (Gait)  10  -MS  100  -MS    Deviations/Abnormal Patterns (Gait)  avery decreased;gait speed decreased  -MS  avery decreased;gait speed decreased  -MS    Bilateral Gait Deviations  knee buckling, bilateral  -MS  knee buckling, bilateral  -MS    Comment (Gait/Stairs)  Limited due to needing to have a bowel movement- notified nsg patient on commode and to ambulate after.  -MS  Increased assist required within last 50' due to fatigue and knees becoming weak. 9/10 pain reported.  -MS      User Key  (r) = Recorded By, (t) = Taken By, (c) = Cosigned By    Initials Name Provider Type    Maureen Brand, PT Physical Therapist        Obj/Interventions     Row Name 03/04/20 1012          Therapeutic Exercise    Comment (Therapeutic Exercise)  x 10 reps LAQ, incr time required  -MS     Row Name 03/04/20 1625 03/04/20 1012       Static Sitting Balance    Level of [place] (Unsupported Sitting, Static Balance)  supervision  -MS  supervision  -MS    Sitting Position (Unsupported Sitting, Static Balance)  sitting on edge of bed  -MS  sitting on edge of bed  -MS    Row Name 03/04/20 1625 03/04/20 1012       Static Standing Balance    Level of [place] (Supported Standing, Static Balance)  standby assist  -MS  standby assist;contact guard assist  -MS    Assistive Device Utilized (Supported Standing, Static Balance)  walker, rolling  -MS  walker, rolling  -MS    Row Name 03/04/20 1012          Sensory Assessment/Intervention    Sensory General Assessment  no sensation deficits identified  -MS       User Key  (r) = Recorded By, (t) = Taken By, (c) = Cosigned By    Initials Name Provider Type    Maureen Brand, PT Physical Therapist        Goals/Plan    No documentation.       Clinical Impression     Row Name 03/04/20 1628 03/04/20 1013       Pain Scale: Numbers Pre/Post-Treatment    Pain Scale: Numbers, Pretreatment  5/10  -MS  9/10  -MS    Pain Scale: Numbers, Post-Treatment  5/10  -MS  9/10  -MS    Pain  Location - Orientation  lower  -MS  lower  -MS    Pain Location  back  -MS  back  -MS    Pain Intervention(s)  Repositioned;Ambulation/increased activity;Rest  -MS  Repositioned;Ambulation/increased activity;Rest  -MS    Row Name 03/04/20 1013          Plan of Care Review    Plan of Care Reviewed With  patient;spouse  -MS     Outcome Summary  Progressed ambulation distance to 100' CGA with a RW today- further distance limited due to weakness and pain level. Encouraged patient to ambulate with nsg. Will continue to advance as able.  -MS     Row Name 03/04/20 1628 03/04/20 1013       Positioning and Restraints    Pre-Treatment Position  sitting in chair/recliner  -MS  in bed  -MS    Post Treatment Position  bathroom  -MS  chair  -MS    In Chair  --  sitting;call light within reach;encouraged to call for assist;exit alarm on;legs elevated  -MS    Bathroom  sitting;call light within reach;encouraged to call for assist;notified nsg;with family/caregiver  -MS  --      User Key  (r) = Recorded By, (t) = Taken By, (c) = Cosigned By    Initials Name Provider Type    Maureen Brand, PT Physical Therapist        Outcome Measures     Row Name 03/04/20 1014          How much help from another person do you currently need...    Turning from your back to your side while in flat bed without using bedrails?  3  -MS     Moving from lying on back to sitting on the side of a flat bed without bedrails?  2  -MS     Moving to and from a bed to a chair (including a wheelchair)?  3  -MS     Standing up from a chair using your arms (e.g., wheelchair, bedside chair)?  3  -MS     Climbing 3-5 steps with a railing?  2  -MS     To walk in hospital room?  3  -MS     AM-PAC 6 Clicks Score (PT)  16  -MS       User Key  (r) = Recorded By, (t) = Taken By, (c) = Cosigned By    Initials Name Provider Type    Maureen Brand, PT Physical Therapist          PT Recommendation and Plan  Planned Therapy Interventions (PT Eval): balance  training, bed mobility training, gait training, home exercise program, patient/family education, postural re-education, stair training, strengthening, transfer training  Outcome Summary/Treatment Plan (PT)  Anticipated Equipment Needs at Discharge (PT): front wheeled walker(pending patient progress)  Anticipated Discharge Disposition (PT): home with assist  Plan of Care Reviewed With: patient, spouse  Outcome Summary: Progressed ambulation distance to 100' CGA with a RW today- further distance limited due to weakness and pain level. Encouraged patient to ambulate with nsg. Will continue to advance as able.     Time Calculation:   PT Charges     Row Name 03/04/20 1627 03/04/20 0938          Time Calculation    Start Time  1617  -MS  0846  -MS     Stop Time  1627  -MS  0906  -MS     Time Calculation (min)  10 min  -MS  20 min  -MS     PT Received On  03/04/20  -MS  03/04/20  -MS     PT - Next Appointment  03/05/20  -MS  03/05/20  -MS       User Key  (r) = Recorded By, (t) = Taken By, (c) = Cosigned By    Initials Name Provider Type    Maureen Brand, PT Physical Therapist        Therapy Charges for Today     Code Description Service Date Service Provider Modifiers Qty    62040841141 HC PT EVAL MOD COMPLEXITY 2 3/3/2020 Maureen Zuñiga, PT GP 1    56923363702  PT THER PROC EA 15 MIN 3/3/2020 Maureen Zuñiga, PT GP 1    24010645146  PT THER SUPP EA 15 MIN 3/3/2020 Maureen Zuñiga, PT GP 1    [id number]  PT THER PROC EA 15 MIN 3/4/2020 ZuñigaMaureen, PT GP 1    [id number]  PT THER PROC EA 15 MIN 3/4/2020 ZuñigaMaureen, PT GP 1          PT G-Codes  Outcome Measure Options: AM-PAC 6 Clicks Basic Mobility (PT)  AM-PAC 6 Clicks Score (PT): 16    Maureen Zuñiga, ADONAY  3/4/2020

## 2020-03-04 NOTE — PLAN OF CARE
VSS. Pt did have a  temp 100.5 this shift. Percocet, flexeril, and PCA for pain. Voiding without difficulty. No neuro changes.       Problem: Patient Care Overview  Goal: Plan of Care Review  Outcome: Ongoing (interventions implemented as appropriate)  Flowsheets  Taken 3/4/2020 0631  Progress: no change  Taken 3/3/2020 2203  Plan of Care Reviewed With: patient;spouse     Problem: Pain, Chronic (Adult)  Goal: Identify Related Risk Factors and Signs and Symptoms  Outcome: Ongoing (interventions implemented as appropriate)  Flowsheets (Taken 3/3/2020 0627)  Related Risk Factors (Chronic Pain): surgery  Signs and Symptoms (Chronic Pain): verbalization of pain descriptors  Goal: Acceptable Pain/Comfort Level and Functional Ability  Outcome: Ongoing (interventions implemented as appropriate)  Flowsheets (Taken 3/4/2020 0631)  Acceptable Pain/Comfort Level and Functional Ability: making progress toward outcome     Problem: Fall Risk (Adult)  Goal: Identify Related Risk Factors and Signs and Symptoms  Outcome: Ongoing (interventions implemented as appropriate)  Flowsheets  Taken 3/3/2020 0627  Related Risk Factors (Fall Risk): neuro disease/injury  Taken 3/4/2020 0631  Signs and Symptoms (Fall Risk): presence of risk factors  Goal: Absence of Fall  Outcome: Ongoing (interventions implemented as appropriate)  Flowsheets (Taken 3/4/2020 0631)  Absence of Fall: making progress toward outcome

## 2020-03-04 NOTE — PLAN OF CARE
Patient awake and alert.  Patient assist one with transfers and stand by assist with ambulation.  Patient family at bedside.  Patient continues with plan of care.

## 2020-03-05 VITALS
OXYGEN SATURATION: 95 % | HEART RATE: 90 BPM | RESPIRATION RATE: 18 BRPM | SYSTOLIC BLOOD PRESSURE: 121 MMHG | TEMPERATURE: 99.3 F | BODY MASS INDEX: 36.25 KG/M2 | WEIGHT: 267.64 LBS | DIASTOLIC BLOOD PRESSURE: 76 MMHG | HEIGHT: 72 IN

## 2020-03-05 LAB
BASOPHILS # BLD AUTO: 0.03 10*3/MM3 (ref 0–0.2)
BASOPHILS NFR BLD AUTO: 0.3 % (ref 0–1.5)
DEPRECATED RDW RBC AUTO: 40.1 FL (ref 37–54)
EOSINOPHIL # BLD AUTO: 0.32 10*3/MM3 (ref 0–0.4)
EOSINOPHIL NFR BLD AUTO: 3.3 % (ref 0.3–6.2)
ERYTHROCYTE [DISTWIDTH] IN BLOOD BY AUTOMATED COUNT: 12.4 % (ref 12.3–15.4)
HCT VFR BLD AUTO: 33.6 % (ref 37.5–51)
HGB BLD-MCNC: 11.1 G/DL (ref 13–17.7)
IMM GRANULOCYTES # BLD AUTO: 0.05 10*3/MM3 (ref 0–0.05)
IMM GRANULOCYTES NFR BLD AUTO: 0.5 % (ref 0–0.5)
LYMPHOCYTES # BLD AUTO: 1.63 10*3/MM3 (ref 0.7–3.1)
LYMPHOCYTES NFR BLD AUTO: 16.7 % (ref 19.6–45.3)
MCH RBC QN AUTO: 29.1 PG (ref 26.6–33)
MCHC RBC AUTO-ENTMCNC: 33 G/DL (ref 31.5–35.7)
MCV RBC AUTO: 88 FL (ref 79–97)
MONOCYTES # BLD AUTO: 1.05 10*3/MM3 (ref 0.1–0.9)
MONOCYTES NFR BLD AUTO: 10.8 % (ref 5–12)
NEUTROPHILS # BLD AUTO: 6.68 10*3/MM3 (ref 1.7–7)
NEUTROPHILS NFR BLD AUTO: 68.4 % (ref 42.7–76)
NRBC BLD AUTO-RTO: 0 /100 WBC (ref 0–0.2)
PLATELET # BLD AUTO: 177 10*3/MM3 (ref 140–450)
PMV BLD AUTO: 9.9 FL (ref 6–12)
RBC # BLD AUTO: 3.82 10*6/MM3 (ref 4.14–5.8)
WBC NRBC COR # BLD: 9.76 10*3/MM3 (ref 3.4–10.8)

## 2020-03-05 PROCEDURE — 99024 POSTOP FOLLOW-UP VISIT: CPT | Performed by: ORTHOPAEDIC SURGERY

## 2020-03-05 PROCEDURE — 85025 COMPLETE CBC W/AUTO DIFF WBC: CPT | Performed by: PHYSICIAN ASSISTANT

## 2020-03-05 PROCEDURE — 99024 POSTOP FOLLOW-UP VISIT: CPT | Performed by: NURSE PRACTITIONER

## 2020-03-05 PROCEDURE — G0378 HOSPITAL OBSERVATION PER HR: HCPCS

## 2020-03-05 RX ORDER — IBUPROFEN 200 MG
TABLET ORAL EVERY 8 HOURS SCHEDULED
Status: DISCONTINUED | OUTPATIENT
Start: 2020-03-05 | End: 2020-03-05 | Stop reason: HOSPADM

## 2020-03-05 RX ORDER — SENNA AND DOCUSATE SODIUM 50; 8.6 MG/1; MG/1
1 TABLET, FILM COATED ORAL 2 TIMES DAILY
Qty: 60 TABLET | Refills: 0 | Status: SHIPPED | OUTPATIENT
Start: 2020-03-05 | End: 2020-06-02

## 2020-03-05 RX ORDER — OXYCODONE AND ACETAMINOPHEN 10; 325 MG/1; MG/1
TABLET ORAL
Qty: 50 TABLET | Refills: 0
Start: 2020-03-05 | End: 2020-03-05

## 2020-03-05 RX ORDER — TAMSULOSIN HYDROCHLORIDE 0.4 MG/1
0.4 CAPSULE ORAL DAILY
Qty: 30 CAPSULE | Refills: 0 | Status: SHIPPED | OUTPATIENT
Start: 2020-03-06

## 2020-03-05 RX ORDER — IBUPROFEN 200 MG
TABLET ORAL EVERY 8 HOURS SCHEDULED
Start: 2020-03-05 | End: 2020-06-02

## 2020-03-05 RX ORDER — CYCLOBENZAPRINE HCL 10 MG
10 TABLET ORAL 3 TIMES DAILY PRN
Qty: 30 TABLET | Refills: 0 | Status: SHIPPED | OUTPATIENT
Start: 2020-03-05 | End: 2020-03-26 | Stop reason: SDUPTHER

## 2020-03-05 RX ORDER — OXYCODONE AND ACETAMINOPHEN 10; 325 MG/1; MG/1
TABLET ORAL
Qty: 50 TABLET | Refills: 0 | Status: SHIPPED | OUTPATIENT
Start: 2020-03-05 | End: 2020-03-16 | Stop reason: SDUPTHER

## 2020-03-05 RX ADMIN — CYCLOBENZAPRINE 10 MG: 10 TABLET, FILM COATED ORAL at 06:45

## 2020-03-05 RX ADMIN — OXYCODONE HYDROCHLORIDE AND ACETAMINOPHEN 2 TABLET: 10; 325 TABLET ORAL at 05:41

## 2020-03-05 RX ADMIN — SENNOSIDES AND DOCUSATE SODIUM 1 TABLET: 8.6; 5 TABLET ORAL at 09:30

## 2020-03-05 RX ADMIN — OXYCODONE HYDROCHLORIDE AND ACETAMINOPHEN 2 TABLET: 10; 325 TABLET ORAL at 09:49

## 2020-03-05 RX ADMIN — VALSARTAN 320 MG: 320 TABLET ORAL at 09:30

## 2020-03-05 RX ADMIN — TAMSULOSIN HYDROCHLORIDE 0.4 MG: 0.4 CAPSULE ORAL at 09:30

## 2020-03-05 RX ADMIN — POLYETHYLENE GLYCOL 3350 17 G: 17 POWDER, FOR SOLUTION ORAL at 09:31

## 2020-03-05 RX ADMIN — BISACODYL 10 MG: 10 SUPPOSITORY RECTAL at 06:45

## 2020-03-05 RX ADMIN — OXYCODONE HYDROCHLORIDE AND ACETAMINOPHEN 2 TABLET: 10; 325 TABLET ORAL at 01:23

## 2020-03-05 RX ADMIN — OXYCODONE HYDROCHLORIDE AND ACETAMINOPHEN 2 TABLET: 10; 325 TABLET ORAL at 13:22

## 2020-03-05 NOTE — PROGRESS NOTES
Case Management Discharge Note      Final Note: Discharged home    Provided Post Acute Provider List?: N/A(Denies need. Plan is home )    Transportation Services  Private: Car    Final Discharge Disposition Code: 01 - home or self-care

## 2020-03-05 NOTE — PROGRESS NOTES
Continued Stay Note  [facility]     Patient Name: Philip Calloway  MRN: [id number]  Today's Date: 3/5/2020    Admit Date: 3/2/2020    Discharge Plan     Row Name 03/05/20 0935       Plan    Plan  DME    Plan Comments  Commode was delivered to room.        Discharge Codes    No documentation.             Petra Christian RN

## 2020-03-05 NOTE — DISCHARGE SUMMARY
Orthopedic Discharge Summary      Patient: Philip Calloway  YOB: 1960  Medical Record Number: [id number]    Attending Physician: Jose Bourne MD  Consulting Physician(s):   Consults     Date and Time Order Name Status Description    3/4/2020 1525 Inpatient Urology Consult            Date of Admission: 3/2/2020  5:39 AM  Date of Discharge:3/5/2020    Discharge Diagnosis: Lumbar 3 to lumbar 5 fusion with instrumentation with laminectomy by Dr. Miranda,   Acute Blood Loss Anemia      Presenting Problem/History of Present Illness: Spondylolisthesis of lumbar region [M43.16]  Spinal stenosis of lumbar region with neurogenic claudication [M48.062]  Spondylolisthesis of lumbar region [M43.16]  Spondylolisthesis of lumbar region [M43.16]      Allergies: No Known Allergies    Discharge Medications     Discharge Medications      New Medications      Instructions Start Date   cyclobenzaprine 10 MG tablet  Commonly known as:  FLEXERIL   10 mg, Oral, 3 Times Daily PRN      neomycin-bacitracin-polymyxin 5-400-5000 ointment   Topical, Every 8 Hours Scheduled      oxyCODONE-acetaminophen  MG per tablet  Commonly known as:  PERCOCET   Take 1-2 tabs po q 4 hours prn pain      polyethylene glycol pack packet  Commonly known as:  MIRALAX   17 g, Oral, Daily   Start Date:  March 6, 2020     SENEXON-S 8.6-50 MG per tablet  Generic drug:  sennosides-docusate   1 tablet, Oral, 2 Times Daily      tamsulosin 0.4 MG capsule 24 hr capsule  Commonly known as:  FLOMAX   0.4 mg, Oral, Daily   Start Date:  March 6, 2020        Continue These Medications      Instructions Start Date   LORazepam 1 MG tablet  Commonly known as:  ATIVAN   0.5-1 mg, Oral, 2 Times Daily PRN      valsartan 320 MG tablet  Commonly known as:  DIOVAN   320 mg, Oral, Daily               Past Medical History:   Diagnosis Date   • Anxiety    • Arthritis     OSTEO   • At risk for obstructive sleep apnea     STOP BANG 7   • Balance problem     SINCE BACK  PROBLEMS   • Cataract     NOT RIPE   • History of migraine    • History of palpitations    • Hypertension    • Low back pain    • Lumbar disc disease     L3-L5   • McArdle's disease (CMS/HCC)     HISTORY OF. HAS NOT HAD ANY ISSUES WITH ANESTHESIA IN THE PAST   • Numbness and tingling     TEMO LEGS   • PONV (postoperative nausea and vomiting)    • Spinal stenosis of lumbar region with neurogenic claudication    • Vision disorder     LEGALLY BLIND LEFT EYE. CONGENITAL        Past Surgical History:   Procedure Laterality Date   • BACK SURGERY  1998   • EPIDURAL BLOCK     • HERNIA REPAIR  2016   • SPINE SURGERY          Social History     Occupational History   • Occupation: manager/sales   Tobacco Use   • Smoking status: Former Smoker     Packs/day: 1.00     Years: 20.00     Pack years: 20.00     Types: Cigarettes, Cigars     Last attempt to quit: 2019     Years since quittin.5   • Smokeless tobacco: Never Used   • Tobacco comment: CIGARS PRIOR TO CIGARETTES   Substance and Sexual Activity   • Alcohol use: Yes     Comment: socially   • Drug use: No   • Sexual activity: Defer      Social History     Social History Narrative   • Not on file        Family History   Problem Relation Age of Onset   • Diabetes Mother    • Cancer Father    • Malig Hyperthermia Neg Hx          Physical Exam: 59 y.o. male  General Appearance:    Alert, cooperative, in no acute distress                      Vitals:    20 1757 20 2043 20 0442 20 1250   BP: 135/85  138/89 121/76   BP Location: Right arm  Right arm Right arm   Patient Position: Lying   Lying   Pulse: 93 95 79 90   Resp: 20 17 17 18   Temp: 100.1 °F (37.8 °C) 99.2 °F (37.3 °C) 99 °F (37.2 °C) 99.3 °F (37.4 °C)   TempSrc: Oral Oral  Oral   SpO2: 94% 94%  95%   Weight:       Height:            DIAGNOSTIC TESTS:   Admission on 2020   Component Date Value Ref Range Status   • ABO Type 2020 B   Final   • RH type 2020 Positive    Final   • Antibody Screen 03/02/2020 Negative   Final   • T&S Expiration Date 03/02/2020 3/5/2020 11:59:59 PM   Final   • Hemoglobin 03/02/2020 12.5* 13.0 - 17.7 g/dL Final   • Hematocrit 03/02/2020 37.0* 37.5 - 51.0 % Final   • Glucose 03/02/2020 178* 70 - 130 mg/dL Final   • Hemoglobin 03/03/2020 11.7* 13.0 - 17.7 g/dL Final   • Hematocrit 03/03/2020 33.3* 37.5 - 51.0 % Final   • Glucose 03/03/2020 132* 65 - 99 mg/dL Final   • BUN 03/03/2020 9  6 - 20 mg/dL Final   • Creatinine 03/03/2020 0.62* 0.76 - 1.27 mg/dL Final   • Sodium 03/03/2020 141  136 - 145 mmol/L Final   • Potassium 03/03/2020 4.2  3.5 - 5.2 mmol/L Final   • Chloride 03/03/2020 104  98 - 107 mmol/L Final   • CO2 03/03/2020 26.5  22.0 - 29.0 mmol/L Final   • Calcium 03/03/2020 8.2* 8.6 - 10.5 mg/dL Final   • eGFR Non African Amer 03/03/2020 134  >60 mL/min/1.73 Final   • BUN/Creatinine Ratio 03/03/2020 14.5  7.0 - 25.0 Final   • Anion Gap 03/03/2020 10.5  5.0 - 15.0 mmol/L Final   • WBC 03/04/2020 10.62  3.40 - 10.80 10*3/mm3 Final   • RBC 03/04/2020 3.70* 4.14 - 5.80 10*6/mm3 Final   • Hemoglobin 03/04/2020 10.7* 13.0 - 17.7 g/dL Final   • Hematocrit 03/04/2020 32.3* 37.5 - 51.0 % Final   • MCV 03/04/2020 87.3  79.0 - 97.0 fL Final   • MCH 03/04/2020 28.9  26.6 - 33.0 pg Final   • MCHC 03/04/2020 33.1  31.5 - 35.7 g/dL Final   • RDW 03/04/2020 12.5  12.3 - 15.4 % Final   • RDW-SD 03/04/2020 40.0  37.0 - 54.0 fl Final   • MPV 03/04/2020 9.6  6.0 - 12.0 fL Final   • Platelets 03/04/2020 173  140 - 450 10*3/mm3 Final   • Neutrophil % 03/04/2020 65.6  42.7 - 76.0 % Final   • Lymphocyte % 03/04/2020 19.2* 19.6 - 45.3 % Final   • Monocyte % 03/04/2020 11.5  5.0 - 12.0 % Final   • Eosinophil % 03/04/2020 3.0  0.3 - 6.2 % Final   • Basophil % 03/04/2020 0.3  0.0 - 1.5 % Final   • Immature Grans % 03/04/2020 0.4  0.0 - 0.5 % Final   • Neutrophils, Absolute 03/04/2020 6.97  1.70 - 7.00 10*3/mm3 Final   • Lymphocytes, Absolute 03/04/2020 2.04  0.70 - 3.10  10*3/mm3 Final   • Monocytes, Absolute 03/04/2020 1.22* 0.10 - 0.90 10*3/mm3 Final   • Eosinophils, Absolute 03/04/2020 0.32  0.00 - 0.40 10*3/mm3 Final   • Basophils, Absolute 03/04/2020 0.03  0.00 - 0.20 10*3/mm3 Final   • Immature Grans, Absolute 03/04/2020 0.04  0.00 - 0.05 10*3/mm3 Final   • nRBC 03/04/2020 0.0  0.0 - 0.2 /100 WBC Final   • WBC 03/05/2020 9.76  3.40 - 10.80 10*3/mm3 Final   • RBC 03/05/2020 3.82* 4.14 - 5.80 10*6/mm3 Final   • Hemoglobin 03/05/2020 11.1* 13.0 - 17.7 g/dL Final   • Hematocrit 03/05/2020 33.6* 37.5 - 51.0 % Final   • MCV 03/05/2020 88.0  79.0 - 97.0 fL Final   • MCH 03/05/2020 29.1  26.6 - 33.0 pg Final   • MCHC 03/05/2020 33.0  31.5 - 35.7 g/dL Final   • RDW 03/05/2020 12.4  12.3 - 15.4 % Final   • RDW-SD 03/05/2020 40.1  37.0 - 54.0 fl Final   • MPV 03/05/2020 9.9  6.0 - 12.0 fL Final   • Platelets 03/05/2020 177  140 - 450 10*3/mm3 Final   • Neutrophil % 03/05/2020 68.4  42.7 - 76.0 % Final   • Lymphocyte % 03/05/2020 16.7* 19.6 - 45.3 % Final   • Monocyte % 03/05/2020 10.8  5.0 - 12.0 % Final   • Eosinophil % 03/05/2020 3.3  0.3 - 6.2 % Final   • Basophil % 03/05/2020 0.3  0.0 - 1.5 % Final   • Immature Grans % 03/05/2020 0.5  0.0 - 0.5 % Final   • Neutrophils, Absolute 03/05/2020 6.68  1.70 - 7.00 10*3/mm3 Final   • Lymphocytes, Absolute 03/05/2020 1.63  0.70 - 3.10 10*3/mm3 Final   • Monocytes, Absolute 03/05/2020 1.05* 0.10 - 0.90 10*3/mm3 Final   • Eosinophils, Absolute 03/05/2020 0.32  0.00 - 0.40 10*3/mm3 Final   • Basophils, Absolute 03/05/2020 0.03  0.00 - 0.20 10*3/mm3 Final   • Immature Grans, Absolute 03/05/2020 0.05  0.00 - 0.05 10*3/mm3 Final   • nRBC 03/05/2020 0.0  0.0 - 0.2 /100 WBC Final       No results found.    Hospital Course:  59 y.o. male admitted to [facility] to services of Jose Bourne MD with Spondylolisthesis of lumbar region [M43.16]  Spinal stenosis of lumbar region with neurogenic claudication [M48.062]  Spondylolisthesis of lumbar  region [M43.16]  Spondylolisthesis of lumbar region [M43.16] on 3/2/2020 and underwent Lumbar 3 to lumbar 5 fusion with instrumentation with laminectomy by Jose Hawkins MD. Antibiotic and VTE prophylaxis were per [facility]IP protocols.  The patient was admitted to the floor where IV and/or oral pain medications were administered for postoperative pain.  At discharge the incisional pain was tolerable and preop neurologic function was intact.  The dressing was dry and the wound was clean.    Condition on Discharge:  Stable    Discharge Instructions:   1.  Patient may weight bear as tolerated unless otherwise specified.   2.  May use ice to back incision as needed.   3.  Patient also instructed on incentive spirometer during hospitalization and encouraged to continue to use at home regularly.   4.  Dressing is waterproof and should remain on and intact until seen in the office at 1st PO visit.  Patient has been instructed to contact the office if dressing becomes loose or saturated.   5.  Patient may shower on POD #3 if and when all wound drainage has stopped.    6.  Back brace should be worn when up and about.  It need not be worn to the bathroom and certainly not in the shower.   7.  Patient is to avoid forward bending and twisting at the waist.  No lifting greater than 5 pounds.         A detailed list of instructions specific to the operation was given to the patient at the time of discharge.    Follow up Instructions:  Follow up in the office with Dr. Jose Bourne Jr. in 2-3 weeks - patient to call the office at [phone] to schedule. Prescriptions were given for pain medication.    Follow-up Appointments  Future Appointments   Date Time Provider Department Center   3/25/2020 10:10 AM Jose Bourne MD [facility] None     Additional Instructions for the Follow-ups that You Need to Schedule     Discharge Follow-up with Specialty: Orthopedics; 2 Weeks   As directed      Specialty:  Orthopedics     Follow Up:  2 Weeks    Follow Up Details:  Return to the office to see Dr. Jose Bourne               Discharge Disposition Plan:today to home    Date: 3/5/2020    Jose Bourne MD  03/05/20  3:11 PM

## 2020-03-05 NOTE — PROGRESS NOTES
Postop   LOS: 0 days   Patient Care Team:  Geoffrey Rodriguez MD as PCP - General (Internal Medicine)    Chief Complaint: Postop back pain    Subjective     Sitting up on side of the bed using incentive spirometer.  Wife at bedside.    Interval History:   No new complaints.  History taken from: patient family    Objective      A and O x3  Sensation intact  Denies saddle anesthesia, but states he has had left inguinal numbness since hernia repair  Passing gas but no bowel movement since surgery  No calf tenderness or swelling  Lung effort normal  Moves extremities well  Low midline lumbar dressing dry and intact, no swelling or erythema noted    Vital Signs  Temp:  [99 °F (37.2 °C)-100.1 °F (37.8 °C)] 99.3 °F (37.4 °C)  Heart Rate:  [79-95] 90  Resp:  [17-20] 18  BP: (121-138)/(76-89) 121/76       Results Review:     I reviewed the patient's new clinical results.  .  Results from last 7 days   Lab Units 03/05/20  0422 03/04/20  0509 03/03/20  0531   WBC 10*3/mm3 9.76 10.62  --    HEMOGLOBIN g/dL 11.1* 10.7* 11.7*   HEMATOCRIT % 33.6* 32.3* 33.3*   PLATELETS 10*3/mm3 177 173  --          Assessment/Plan       Spondylolisthesis of lumbar region    Spinal stenosis of lumbar region with neurogenic claudication      Postop day #3 L3-L5 lumbar decompression and fusion by Charles Esquivel and Steffen.  Patient states he has been ambulating the hallway and tolerating it well.  Pain is well controlled on oral pain medication.  He has not yet had a bowel movement but has tried suppository.  Follow-up in office in 3 to 4 weeks, office will arrange.  From neurosurgical perspective can go home when okay with Dr. Bourne.      Kamilah Godinez, APRN  03/05/20  2:42 PM

## 2020-03-05 NOTE — PROGRESS NOTES
Postop day 3: T-max 100.2, VSS awake alert oriented.  No leg pain he moves the legs well.  Passing gas and some liquid stool still complains of abdominal bloating.  Abdomen is slightly rotund and nontender.  Hemoglobin 11.1.  Doing well can probably go home later today if he is feeling up to it or we will keep him until tomorrow.

## 2020-03-16 DIAGNOSIS — M43.16 SPONDYLOLISTHESIS OF LUMBAR REGION: ICD-10-CM

## 2020-03-16 DIAGNOSIS — M54.16 LUMBAR RADICULOPATHY: ICD-10-CM

## 2020-03-16 DIAGNOSIS — M48.062 SPINAL STENOSIS OF LUMBAR REGION WITH NEUROGENIC CLAUDICATION: ICD-10-CM

## 2020-03-16 RX ORDER — OXYCODONE AND ACETAMINOPHEN 10; 325 MG/1; MG/1
TABLET ORAL
Qty: 50 TABLET | Refills: 0 | Status: SHIPPED | OUTPATIENT
Start: 2020-03-16 | End: 2020-03-26 | Stop reason: SDUPTHER

## 2020-03-16 NOTE — TELEPHONE ENCOUNTER
Patient is calling requesting a refill of Oxycodone 10/325, 1-2 every 4 hrs, #50. He also needs a refill of Flexeril 10 mg, 1 3X daily, #30.  Please advise.cld

## 2020-03-25 ENCOUNTER — OFFICE VISIT (OUTPATIENT)
Dept: ORTHOPEDIC SURGERY | Facility: CLINIC | Age: 60
End: 2020-03-25

## 2020-03-25 VITALS — WEIGHT: 255 LBS | TEMPERATURE: 97.3 F | BODY MASS INDEX: 34.54 KG/M2 | HEIGHT: 72 IN

## 2020-03-25 DIAGNOSIS — Z98.1 S/P LUMBAR FUSION: Primary | ICD-10-CM

## 2020-03-25 PROCEDURE — 99024 POSTOP FOLLOW-UP VISIT: CPT | Performed by: ORTHOPAEDIC SURGERY

## 2020-03-25 PROCEDURE — 72100 X-RAY EXAM L-S SPINE 2/3 VWS: CPT | Performed by: ORTHOPAEDIC SURGERY

## 2020-03-26 DIAGNOSIS — M54.16 LUMBAR RADICULOPATHY: ICD-10-CM

## 2020-03-26 DIAGNOSIS — M43.16 SPONDYLOLISTHESIS OF LUMBAR REGION: ICD-10-CM

## 2020-03-26 DIAGNOSIS — M48.062 SPINAL STENOSIS OF LUMBAR REGION WITH NEUROGENIC CLAUDICATION: ICD-10-CM

## 2020-03-26 RX ORDER — OXYCODONE AND ACETAMINOPHEN 10; 325 MG/1; MG/1
TABLET ORAL
Qty: 50 TABLET | Refills: 0 | Status: SHIPPED | OUTPATIENT
Start: 2020-03-26 | End: 2020-04-07 | Stop reason: SDUPTHER

## 2020-03-26 RX ORDER — CYCLOBENZAPRINE HCL 10 MG
10 TABLET ORAL 3 TIMES DAILY PRN
Qty: 30 TABLET | Refills: 0 | Status: SHIPPED | OUTPATIENT
Start: 2020-03-26 | End: 2020-04-27

## 2020-03-26 NOTE — TELEPHONE ENCOUNTER
Patient is calling requesting  A refill of Oxycodone 10/325, 1-2 every 4 hrs, #50. He also needs a refill of Flexeril 10 mg, 1 3X daily, #30. Please advise.cld Please advise.cld

## 2020-04-07 DIAGNOSIS — M54.16 LUMBAR RADICULOPATHY: ICD-10-CM

## 2020-04-07 DIAGNOSIS — M43.16 SPONDYLOLISTHESIS OF LUMBAR REGION: ICD-10-CM

## 2020-04-07 DIAGNOSIS — M48.062 SPINAL STENOSIS OF LUMBAR REGION WITH NEUROGENIC CLAUDICATION: ICD-10-CM

## 2020-04-07 RX ORDER — OXYCODONE AND ACETAMINOPHEN 10; 325 MG/1; MG/1
TABLET ORAL
Qty: 50 TABLET | Refills: 0 | Status: SHIPPED | OUTPATIENT
Start: 2020-04-07

## 2020-04-07 NOTE — TELEPHONE ENCOUNTER
Patient is calling requesting a refill of Oxycodone 10/325, 1-2 every 4 hrs, #50. Please advise.cld

## 2020-04-16 DIAGNOSIS — M43.16 SPONDYLOLISTHESIS OF LUMBAR REGION: ICD-10-CM

## 2020-04-16 DIAGNOSIS — M48.062 SPINAL STENOSIS OF LUMBAR REGION WITH NEUROGENIC CLAUDICATION: ICD-10-CM

## 2020-04-16 DIAGNOSIS — M54.16 LUMBAR RADICULOPATHY: ICD-10-CM

## 2020-04-16 RX ORDER — OXYCODONE AND ACETAMINOPHEN 10; 325 MG/1; MG/1
TABLET ORAL
Qty: 50 TABLET | Refills: 0 | OUTPATIENT
Start: 2020-04-16

## 2020-04-16 RX ORDER — HYDROCODONE BITARTRATE AND ACETAMINOPHEN 5; 325 MG/1; MG/1
TABLET ORAL
Qty: 35 TABLET | Refills: 0 | Status: SHIPPED | OUTPATIENT
Start: 2020-04-16 | End: 2020-06-02

## 2020-04-16 NOTE — TELEPHONE ENCOUNTER
He said that was fine. He wants that and he will just start going to [facility] from now on.

## 2020-04-16 NOTE — TELEPHONE ENCOUNTER
Too much narcotic for this point in time.  Take tylenol instead.  If he wants he may have a last fill for hydrocodone 5/325 I po q 8 hr prn pain #35.  Tacho it up

## 2020-04-22 NOTE — PROGRESS NOTES
Subjective     History of Present Illness    History of Present Illness: Philip Calloway is a 59 y.o. male is here today for follow-up via telephone call. Mr. Calloway is 7 weeks out from having a Bilateral L3-4 and L4-5 laminectomy with a neural lysis at L4-5 on the left side as well as medial facetectomy and foraminotomies by Dr. Esquivel and fusion by orthopedics on 03/02/20.    He reports that he is overall doing very well.  He went from having constant numbness and tingling in the right leg to now only having it intermittently.  He does report some slight increase in his low back discomfort and has had to take an additional hydrocodone at night to help him sleep.  Otherwise, he is back on his baseline narcotic dosing manage by pain management.  He denies any weakness in his legs.  His balance and gait are stable.  The incision sites are healing well.  He is compliant wearing the brace as directed.  He is also wearing the bone growth stimulator 2 hours daily.  He was mostly concerned regarding the ongoing sensory changes in the leg.  He denies any new problems or concerns.    You have chosen to receive care through a telephone visit. Do you consent to use a telephone visit for your medical care today? Yes      The following portions of the patient's history were reviewed and updated as appropriate: allergies, current medications, past family history, past medical history, past social history, past surgical history and problem list.    Review of Systems   Constitutional: Negative.    HENT: Negative.    Eyes: Negative.    Respiratory: Negative.    Cardiovascular: Negative.    Gastrointestinal: Negative.    Endocrine: Negative.    Genitourinary: Negative.    Musculoskeletal: Positive for arthralgias and back pain. Negative for gait problem.   Skin: Negative.    Allergic/Immunologic: Negative.    Neurological: Positive for numbness.   Hematological: Negative.    Psychiatric/Behavioral: Negative.        Objective        Physical Exam  Neurologic Exam        Assessment/Plan   Independent Review of Radiographic Studies:      I personally reviewed the images from the following studies.    No new imaging    Medical Decision Making:    I called the patient and spent 13 minutes on the phone for his postoperative telehealth visit.  The patient was home and I completed the call from the office.    As noted above, he is overall doing very well.  I explained that he may have ongoing paresthesias in the leg for many months, if not longer.  This is perfectly normal as the nerve is healing, especially following a multilevel lumbar decompression and fusion.  Regarding the low back discomfort this should also improve with time.  He continues on strict bending, twisting and lifting precautions under the direction of Dr. Avilez.  He reports that the incision sites are healing well.  He denies any other concerns at this time.  He is back on his baseline hydrocodone prescribed by pain management.  All questions from the patient were answered at length.  From our standpoint we will see him back on an as-needed basis and will defer ongoing postoperative management to Dr. Avilez.  He is to call at anytime with any questions or concerns.    Plan: Return to office as needed    Diagnoses and all orders for this visit:    Lumbar radiculopathy      Return if symptoms worsen or fail to improve.

## 2020-04-23 ENCOUNTER — TELEPHONE (OUTPATIENT)
Dept: NEUROSURGERY | Facility: CLINIC | Age: 60
End: 2020-04-23

## 2020-04-23 NOTE — TELEPHONE ENCOUNTER
I called and LVM for patient to let them know that we were changing appointment from an in office appointment to a telephone visit because we are limiting the amount of patients that are coming into the office due to the COVID-19 virus.

## 2020-04-27 ENCOUNTER — OFFICE VISIT (OUTPATIENT)
Dept: NEUROSURGERY | Facility: CLINIC | Age: 60
End: 2020-04-27

## 2020-04-27 DIAGNOSIS — M54.16 LUMBAR RADICULOPATHY: Primary | ICD-10-CM

## 2020-04-27 PROCEDURE — 99024 POSTOP FOLLOW-UP VISIT: CPT | Performed by: NURSE PRACTITIONER

## 2020-06-02 ENCOUNTER — OFFICE VISIT (OUTPATIENT)
Dept: ORTHOPEDIC SURGERY | Facility: CLINIC | Age: 60
End: 2020-06-02

## 2020-06-02 VITALS — TEMPERATURE: 98.4 F | WEIGHT: 255 LBS | HEIGHT: 72 IN | BODY MASS INDEX: 34.54 KG/M2

## 2020-06-02 DIAGNOSIS — Z98.1 S/P LUMBAR FUSION: Primary | ICD-10-CM

## 2020-06-02 PROCEDURE — 99024 POSTOP FOLLOW-UP VISIT: CPT | Performed by: ORTHOPAEDIC SURGERY

## 2020-06-02 PROCEDURE — 72100 X-RAY EXAM L-S SPINE 2/3 VWS: CPT | Performed by: ORTHOPAEDIC SURGERY

## 2020-06-02 NOTE — PROGRESS NOTES
He is 3 months out from a laminectomy and fusion with instrumentation doing very well.  He is actually back at work not doing any lifting mostly supervising.  He has no leg pain minimal back pain.  X-rays show good position of graft and implants slight hyperlordotic posture.  No change from prior films but further bony consolidation.  I want to wait a month before we start therapy but I will go ahead and order that.  I want to see him back in 2 months.

## 2020-10-15 ENCOUNTER — HOSPITAL ENCOUNTER (OUTPATIENT)
Dept: CARDIOLOGY | Facility: HOSPITAL | Age: 60
Discharge: HOME | End: 2020-10-15
Attending: INTERNAL MEDICINE

## 2021-03-16 ENCOUNTER — OFFICE VISIT (OUTPATIENT)
Dept: ORTHOPEDIC SURGERY | Facility: CLINIC | Age: 61
End: 2021-03-16

## 2021-03-16 VITALS — HEIGHT: 72 IN | BODY MASS INDEX: 33.86 KG/M2 | WEIGHT: 250 LBS | TEMPERATURE: 98.6 F

## 2021-03-16 DIAGNOSIS — M48.061 SPINAL STENOSIS OF LUMBAR REGION WITHOUT NEUROGENIC CLAUDICATION: ICD-10-CM

## 2021-03-16 DIAGNOSIS — Z98.1 S/P LUMBAR FUSION: Primary | ICD-10-CM

## 2021-03-16 PROCEDURE — 99213 OFFICE O/P EST LOW 20 MIN: CPT | Performed by: ORTHOPAEDIC SURGERY

## 2021-03-16 PROCEDURE — 72100 X-RAY EXAM L-S SPINE 2/3 VWS: CPT | Performed by: ORTHOPAEDIC SURGERY

## 2021-03-16 NOTE — PROGRESS NOTES
He is having some occasional sensation of numbness in the right knee area.  He is status post 3-5 laminectomy and fusion with instrumentation last summer.  He got rid of his pain but there is still some leftover numbness.  On exam excellent strength in the legs.  Sensation appears intact.  Straight leg raise is negative no evidence of atrophy.  X-rays show a rocksolid fusion compared to prior films.  Some slight increase in scoliosis and degenerative changes above.  There is some degenerative change below as well and perhaps even slight listhesis.  If we go back any is going to require multilevel decompression and fusion and he may not go back to work at that is the case right now symptoms are minimal some going to watch him along and see him back as needed.  If symptoms recur lets get him into see Dr. Miranda

## 2023-06-14 ENCOUNTER — TRANSCRIBE ORDERS (OUTPATIENT)
Dept: ADMINISTRATIVE | Facility: HOSPITAL | Age: 63
End: 2023-06-14
Payer: COMMERCIAL

## 2023-06-14 DIAGNOSIS — M54.12 CERVICAL RADICULOPATHY: Primary | ICD-10-CM

## 2023-08-15 ENCOUNTER — TRANSCRIBE ORDERS (OUTPATIENT)
Dept: ADMINISTRATIVE | Facility: HOSPITAL | Age: 63
End: 2023-08-15
Payer: COMMERCIAL

## 2023-08-15 DIAGNOSIS — M54.14 RADICULOPATHY OF THORACIC REGION: Primary | ICD-10-CM

## 2023-09-11 ENCOUNTER — TRANSCRIBE ORDERS (OUTPATIENT)
Dept: ADMINISTRATIVE | Facility: HOSPITAL | Age: 63
End: 2023-09-11
Payer: COMMERCIAL

## 2023-09-11 ENCOUNTER — HOSPITAL ENCOUNTER (OUTPATIENT)
Dept: MRI IMAGING | Facility: HOSPITAL | Age: 63
Discharge: HOME | End: 2023-09-11
Payer: COMMERCIAL

## 2023-09-11 ENCOUNTER — HOSPITAL ENCOUNTER (OUTPATIENT)
Dept: GENERAL RADIOLOGY | Facility: HOSPITAL | Age: 63
Discharge: HOME | End: 2023-09-11
Payer: COMMERCIAL

## 2023-09-11 DIAGNOSIS — Z98.1 HISTORY OF LUMBAR FUSION: Primary | ICD-10-CM

## 2023-09-11 DIAGNOSIS — Z98.1 HISTORY OF LUMBAR FUSION: ICD-10-CM

## 2023-09-11 DIAGNOSIS — M54.14 RADICULOPATHY OF THORACIC REGION: ICD-10-CM

## 2023-09-11 PROCEDURE — 72146 MRI CHEST SPINE W/O DYE: CPT

## 2023-09-11 PROCEDURE — 72100 X-RAY EXAM L-S SPINE 2/3 VWS: CPT

## 2023-11-29 NOTE — PROGRESS NOTES
"Chief Complaint  Establish Care (Pt previously saw Dr. Rodriguez, pt states that he is having a hard time reaching anyone to get his records. ) and Hypertension (BP medication refill, discuss starting meloxicam back.)    Subjective          Philip Calloway presents to [facility]  History of Present Illness  He is here to establish care. He is a former patient of Dr. Rodriguez.    HTN: He is taking valsartan 320 mg daily. BP is high in office today. He doesn't check his BP at home. He denies chest pain, blurred vision, H/A. He does have furosemide 40 mg daily as needed. He hasn't taken the medication in a while.     Spinal stenosis/lumbar radiculopathy: He is going to pain management. He has been going for about 4-5 years ago. He is currently taking oxycodone/apap.     Arthritis: He was taking meloxicam 15 mg. He was taken off the medication after his bone graft fusion. He hasn't taken the medication since 2020.     Migraines: He uses Imitrex prn for his migraines.     He believes that he did Cologuard about over a year ago.      Objective   Vital Signs:   /75 (BP Location: Left arm, Patient Position: Sitting)   Pulse 81   Ht 182.9 cm (72\")   Wt 121 kg (266 lb)   BMI 36.08 kg/m²     Physical Exam  Constitutional:       General: He is not in acute distress.     Appearance: Normal appearance. He is obese.   HENT:      Head: Normocephalic.   Eyes:      Pupils: Pupils are equal, round, and reactive to light.      Visual Fields: Right eye visual fields normal and left eye visual fields normal.   Neck:      Trachea: Trachea normal.   Cardiovascular:      Rate and Rhythm: Normal rate and regular rhythm.      Heart sounds: Normal heart sounds.   Pulmonary:      Effort: Pulmonary effort is normal.      Breath sounds: Normal breath sounds and air entry.   Musculoskeletal:      Right lower leg: No edema.      Left lower leg: No edema.   Skin:     General: Skin is warm and dry.   Neurological: "      Mental Status: He is alert and oriented to person, place, and time.   Psychiatric:         Mood and Affect: Mood and affect normal.         Behavior: Behavior normal.         Thought Content: Thought content normal.        Result Review :   The following data was reviewed by: TONY Burger on 12/12/2023:                  Assessment and Plan    Diagnoses and all orders for this visit:    1. Encounter to establish care (Primary)    2. Primary hypertension  Assessment & Plan:  Hypertension is unchanged.  Continue current treatment regimen.  Dietary sodium restriction.  Weight loss.  Regular aerobic exercise.  Continue current medications.  Ambulatory blood pressure monitoring.  Blood pressure will be reassessed at the next regular appointment.    He is currently taking valsartan 320 mg daily.  Will refill his medications once his lab work has resulted.    Orders:  -     CBC (No Diff); Future  -     Comprehensive Metabolic Panel; Future  -     Lipid Panel; Future    3. Screening for diabetes mellitus (DM)  -     Hemoglobin A1c; Future    4. Screening for cholesterol level  -     Lipid Panel; Future    5. Need for hepatitis C screening test  -     Hepatitis C Antibody; Future    6. Arthritis  Assessment & Plan:  We will check a CMP and consider meloxicam.  He reports that he did not know it was working for his arthritis until he was taken off the medication.      7. Lumbar radiculopathy    8. Spinal stenosis of lumbar region with neurogenic claudication  Assessment & Plan:  He has had several back surgeries and had received injections in the past.  He is currently taking pain medication and is following with pain management.      9. Other migraine without status migrainosus, not intractable  Assessment & Plan:  He currently only uses Imitrex as needed.          10. Pedal edema  Assessment & Plan:  He is taking furosemide 40 mg daily as needed.  Will check potassium and kidney function.      Class 2 Severe  Obesity (BMI >=35 and <=39.9). Obesity-related health conditions include the following: hypertension. Obesity is newly identified. BMI is is above average; BMI management plan is completed. We discussed portion control and increasing exercise.       Follow Up   Return in about 6 months (around 6/12/2024) for Annual physical.  Patient was given instructions and counseling regarding his condition or for health maintenance advice. Please see specific information pulled into the AVS if appropriate.

## 2023-12-12 ENCOUNTER — OFFICE VISIT (OUTPATIENT)
Dept: FAMILY MEDICINE CLINIC | Age: 63
End: 2023-12-12
Payer: COMMERCIAL

## 2023-12-12 VITALS
WEIGHT: 266 LBS | HEIGHT: 72 IN | DIASTOLIC BLOOD PRESSURE: 75 MMHG | BODY MASS INDEX: 36.03 KG/M2 | HEART RATE: 81 BPM | SYSTOLIC BLOOD PRESSURE: 147 MMHG

## 2023-12-12 DIAGNOSIS — Z13.1 SCREENING FOR DIABETES MELLITUS (DM): ICD-10-CM

## 2023-12-12 DIAGNOSIS — Z13.220 SCREENING FOR CHOLESTEROL LEVEL: ICD-10-CM

## 2023-12-12 DIAGNOSIS — I10 PRIMARY HYPERTENSION: ICD-10-CM

## 2023-12-12 DIAGNOSIS — M48.062 SPINAL STENOSIS OF LUMBAR REGION WITH NEUROGENIC CLAUDICATION: ICD-10-CM

## 2023-12-12 DIAGNOSIS — Z76.89 ENCOUNTER TO ESTABLISH CARE: Primary | ICD-10-CM

## 2023-12-12 DIAGNOSIS — Z11.59 NEED FOR HEPATITIS C SCREENING TEST: ICD-10-CM

## 2023-12-12 DIAGNOSIS — M54.16 LUMBAR RADICULOPATHY: ICD-10-CM

## 2023-12-12 DIAGNOSIS — G43.809 OTHER MIGRAINE WITHOUT STATUS MIGRAINOSUS, NOT INTRACTABLE: ICD-10-CM

## 2023-12-12 DIAGNOSIS — R60.0 PEDAL EDEMA: ICD-10-CM

## 2023-12-12 DIAGNOSIS — M19.90 ARTHRITIS: ICD-10-CM

## 2023-12-12 PROBLEM — G43.909 MIGRAINE: Status: ACTIVE | Noted: 2023-12-12

## 2023-12-12 RX ORDER — DIAZEPAM 10 MG/1
10 TABLET ORAL
COMMUNITY
End: 2023-12-12

## 2023-12-12 RX ORDER — HYDROCODONE BITARTRATE AND ACETAMINOPHEN 7.5; 325 MG/1; MG/1
TABLET ORAL
COMMUNITY
End: 2023-12-12

## 2023-12-12 RX ORDER — FUROSEMIDE 40 MG/1
40 TABLET ORAL DAILY PRN
COMMUNITY

## 2023-12-12 RX ORDER — FUROSEMIDE 40 MG/1
40 TABLET ORAL 2 TIMES DAILY PRN
COMMUNITY
End: 2023-12-12

## 2023-12-12 RX ORDER — BACLOFEN 10 MG/1
1 TABLET ORAL 3 TIMES DAILY PRN
COMMUNITY
End: 2023-12-12

## 2023-12-12 RX ORDER — SUMATRIPTAN 50 MG/1
TABLET, FILM COATED ORAL
COMMUNITY

## 2023-12-12 RX ORDER — MELOXICAM 15 MG/1
TABLET ORAL
COMMUNITY
End: 2023-12-15 | Stop reason: SDUPTHER

## 2023-12-12 RX ORDER — LORAZEPAM 0.5 MG/1
0.5 TABLET ORAL
COMMUNITY
End: 2023-12-12

## 2023-12-12 RX ORDER — LIDOCAINE 50 MG/G
PATCH TOPICAL
COMMUNITY

## 2023-12-12 NOTE — ASSESSMENT & PLAN NOTE
He has had several back surgeries and had received injections in the past.  He is currently taking pain medication and is following with pain management.

## 2023-12-12 NOTE — ASSESSMENT & PLAN NOTE
Hypertension is unchanged.  Continue current treatment regimen.  Dietary sodium restriction.  Weight loss.  Regular aerobic exercise.  Continue current medications.  Ambulatory blood pressure monitoring.  Blood pressure will be reassessed at the next regular appointment.    He is currently taking valsartan 320 mg daily.  Will refill his medications once his lab work has resulted.

## 2023-12-12 NOTE — ASSESSMENT & PLAN NOTE
We will check a CMP and consider meloxicam.  He reports that he did not know it was working for his arthritis until he was taken off the medication.

## 2023-12-14 ENCOUNTER — LAB (OUTPATIENT)
Dept: LAB | Facility: HOSPITAL | Age: 63
End: 2023-12-14
Payer: COMMERCIAL

## 2023-12-14 DIAGNOSIS — I10 PRIMARY HYPERTENSION: ICD-10-CM

## 2023-12-14 DIAGNOSIS — Z13.1 SCREENING FOR DIABETES MELLITUS (DM): ICD-10-CM

## 2023-12-14 DIAGNOSIS — Z13.220 SCREENING FOR CHOLESTEROL LEVEL: ICD-10-CM

## 2023-12-14 DIAGNOSIS — Z11.59 NEED FOR HEPATITIS C SCREENING TEST: ICD-10-CM

## 2023-12-14 LAB
ALBUMIN SERPL-MCNC: 4.4 G/DL (ref 3.5–5.2)
ALBUMIN/GLOB SERPL: 1.3 G/DL
ALP SERPL-CCNC: 61 U/L (ref 39–117)
ALT SERPL W P-5'-P-CCNC: 36 U/L (ref 1–41)
ANION GAP SERPL CALCULATED.3IONS-SCNC: 9.9 MMOL/L (ref 5–15)
AST SERPL-CCNC: 28 U/L (ref 1–40)
BILIRUB SERPL-MCNC: 0.3 MG/DL (ref 0–1.2)
BUN SERPL-MCNC: 13 MG/DL (ref 8–23)
BUN/CREAT SERPL: 16.9 (ref 7–25)
CALCIUM SPEC-SCNC: 9.5 MG/DL (ref 8.6–10.5)
CHLORIDE SERPL-SCNC: 104 MMOL/L (ref 98–107)
CHOLEST SERPL-MCNC: 261 MG/DL (ref 0–200)
CO2 SERPL-SCNC: 25.1 MMOL/L (ref 22–29)
CREAT SERPL-MCNC: 0.77 MG/DL (ref 0.76–1.27)
DEPRECATED RDW RBC AUTO: 41 FL (ref 37–54)
EGFRCR SERPLBLD CKD-EPI 2021: 101.9 ML/MIN/1.73
ERYTHROCYTE [DISTWIDTH] IN BLOOD BY AUTOMATED COUNT: 12.1 % (ref 12.3–15.4)
GLOBULIN UR ELPH-MCNC: 3.3 GM/DL
GLUCOSE SERPL-MCNC: 96 MG/DL (ref 65–99)
HBA1C MFR BLD: 5.8 % (ref 4.8–5.6)
HCT VFR BLD AUTO: 45.8 % (ref 37.5–51)
HCV AB SER DONR QL: NORMAL
HDLC SERPL-MCNC: 33 MG/DL (ref 40–60)
HGB BLD-MCNC: 15 G/DL (ref 13–17.7)
LDLC SERPL CALC-MCNC: 195 MG/DL (ref 0–100)
LDLC/HDLC SERPL: 5.85 {RATIO}
MCH RBC QN AUTO: 29.7 PG (ref 26.6–33)
MCHC RBC AUTO-ENTMCNC: 32.8 G/DL (ref 31.5–35.7)
MCV RBC AUTO: 90.7 FL (ref 79–97)
PLATELET # BLD AUTO: 263 10*3/MM3 (ref 140–450)
PMV BLD AUTO: 8.8 FL (ref 6–12)
POTASSIUM SERPL-SCNC: 4.5 MMOL/L (ref 3.5–5.2)
PROT SERPL-MCNC: 7.7 G/DL (ref 6–8.5)
RBC # BLD AUTO: 5.05 10*6/MM3 (ref 4.14–5.8)
SODIUM SERPL-SCNC: 139 MMOL/L (ref 136–145)
TRIGL SERPL-MCNC: 174 MG/DL (ref 0–150)
VLDLC SERPL-MCNC: 33 MG/DL (ref 5–40)
WBC NRBC COR # BLD AUTO: 7.81 10*3/MM3 (ref 3.4–10.8)

## 2023-12-14 PROCEDURE — 36415 COLL VENOUS BLD VENIPUNCTURE: CPT

## 2023-12-14 PROCEDURE — 83036 HEMOGLOBIN GLYCOSYLATED A1C: CPT

## 2023-12-14 PROCEDURE — 80053 COMPREHEN METABOLIC PANEL: CPT

## 2023-12-14 PROCEDURE — 85027 COMPLETE CBC AUTOMATED: CPT

## 2023-12-14 PROCEDURE — 86803 HEPATITIS C AB TEST: CPT

## 2023-12-14 PROCEDURE — 80061 LIPID PANEL: CPT

## 2023-12-15 DIAGNOSIS — I10 PRIMARY HYPERTENSION: ICD-10-CM

## 2023-12-15 DIAGNOSIS — M19.90 ARTHRITIS: Primary | ICD-10-CM

## 2023-12-15 RX ORDER — MELOXICAM 15 MG/1
15 TABLET ORAL DAILY PRN
Qty: 30 TABLET | Refills: 5 | Status: SHIPPED | OUTPATIENT
Start: 2023-12-15

## 2023-12-15 RX ORDER — ATORVASTATIN CALCIUM 40 MG/1
40 TABLET, FILM COATED ORAL DAILY
Qty: 90 TABLET | Refills: 1 | Status: SHIPPED | OUTPATIENT
Start: 2023-12-15

## 2023-12-15 RX ORDER — VALSARTAN 320 MG/1
320 TABLET ORAL DAILY
Qty: 90 TABLET | Refills: 1 | Status: SHIPPED | OUTPATIENT
Start: 2023-12-15

## 2024-06-12 NOTE — PROGRESS NOTES
"Chief Complaint  Annual Exam, Med Refill, and Hypertension    Subjective          Philip Calloway presents to [facility]  History of Present Illness  He is here for physical.    His tetanus is not up-to-date.  He has not had the shingles vaccine.  He has not had the RSV vaccine. He quit smoking 5 years. He smoked approximately 30 years and 1-1.5 PPD. He has never had a colonoscopy. His cologuard was 2 years; which was negative.  He does go to the dentist. He does wear his seat belt. He does go to the eye doctor. He does try to eat healthy. He is physically active. He drinks a lot of water.     HTN: He is taking valsartan 320 mg daily. He reports that his BP was high after taking the meloxicam. He denies chest pain. He has had heart palpitations for years. He reports that they seem like they are worse than in the past. He reports they can last for days. He saw a heart specialist in the past. He was on a medication in the past, which he is unsure of. He does report dizzy spells. He uses furosemide 40 mg as needed for pedal edema.     Spinal stenosis/lumbar radiculopathy: He follows with pain management.  He is currently taking oxycodone with acetaminophen.    HLD: He was taking atorvastatin 40 mg daily, but he stopped taking the medication due to potential side effects.        Objective   Vital Signs:   /90 (BP Location: Left arm, Patient Position: Sitting) Comment: Shubham  Pulse 73   Temp 98.4 °F (36.9 °C) (Oral)   Ht 182.9 cm (72\")   Wt 118 kg (260 lb)   SpO2 95%   BMI 35.26 kg/m²       Vitals:    06/17/24 0924 06/17/24 0930 06/17/24 0956   BP: (!) 207/97 172/77 180/90  Comment: Shubham   BP Location: Right arm Left arm Left arm   Patient Position: Sitting Sitting Sitting   Pulse: 73     Temp: 98.4 °F (36.9 °C)     TempSrc: Oral     SpO2: 95%     Weight: 118 kg (260 lb)     Height: 182.9 cm (72\")          Physical Exam  Constitutional:       General: He is not in acute " distress.     Appearance: Normal appearance. He is well-developed. He is obese.   HENT:      Head: Normocephalic.      Right Ear: Tympanic membrane, ear canal and external ear normal.      Left Ear: Tympanic membrane, ear canal and external ear normal.      Mouth/Throat:      Mouth: Mucous membranes are moist.      Pharynx: Oropharynx is clear. No oropharyngeal exudate or posterior oropharyngeal erythema.   Eyes:      Extraocular Movements: Extraocular movements intact.      Conjunctiva/sclera: Conjunctivae normal.      Pupils: Pupils are equal, round, and reactive to light.      Visual Fields: Right eye visual fields normal and left eye visual fields normal.   Neck:      Thyroid: No thyromegaly or thyroid tenderness.   Cardiovascular:      Rate and Rhythm: Normal rate and regular rhythm.      Heart sounds: Normal heart sounds.   Pulmonary:      Effort: Pulmonary effort is normal. No retractions.      Breath sounds: Normal breath sounds and air entry.   Abdominal:      General: Abdomen is flat. Bowel sounds are normal. There is no distension.      Palpations: Abdomen is soft. There is no mass.      Tenderness: There is no abdominal tenderness.      Comments: No appreciable hepatosplenomegaly   Musculoskeletal:         General: Normal range of motion.      Cervical back: Normal range of motion and neck supple.      Right lower leg: No edema.      Left lower leg: No edema.   Lymphadenopathy:      Cervical: No cervical adenopathy.   Skin:     General: Skin is warm and dry.   Neurological:      Mental Status: He is alert and oriented to person, place, and time.      Gait: Gait normal.   Psychiatric:         Mood and Affect: Mood and affect normal.         Behavior: Behavior normal.         Thought Content: Thought content normal.        Result Review :   The following data was reviewed by: TONY Burger on 06/12/2024:                  Assessment and Plan    Diagnoses and all orders for this visit:    1. Annual  physical exam (Primary)  -     CBC (No Diff); Future  -     Comprehensive Metabolic Panel; Future  -     Lipid Panel; Future  -     Hemoglobin A1c; Future  -     TSH; Future    2. Preventative health care  -     CBC (No Diff); Future  -     Comprehensive Metabolic Panel; Future  -     Lipid Panel; Future  -     Hemoglobin A1c; Future  -     TSH; Future    3. Primary hypertension  Assessment & Plan:  He is currently taking valsartan 320 mg daily.  His blood pressure is not controlled, so we will add HCTZ 12.5 mg daily.  Will check him in 1 month to see what his blood pressure readings are.  Will also refer to cardiology due to the palpitations.    Orders:  -     valsartan (DIOVAN) 320 MG tablet; Take 1 tablet by mouth Daily.  Dispense: 90 tablet; Refill: 1  -     hydroCHLOROthiazide 12.5 MG tablet; Take 1 tablet by mouth Daily.  Dispense: 30 tablet; Refill: 1    4. Migraine without status migrainosus, not intractable, unspecified migraine type  -     SUMAtriptan (IMITREX) 50 MG tablet; Take one tablet at onset of headache. May repeat dose one time in 2 hours if headache not relieved.  Dispense: 9 tablet; Refill: 1    5. Palpitations  -     Holter monitor - 48 hour; Future  -     Ambulatory Referral to Cardiology    6. Prostate cancer screening  -     PSA SCREENING; Future            Follow Up   Return in about 4 weeks (around 7/15/2024).  Patient was given instructions and counseling regarding his condition or for health maintenance advice. Please see specific information pulled into the AVS if appropriate.

## 2024-06-17 ENCOUNTER — OFFICE VISIT (OUTPATIENT)
Dept: FAMILY MEDICINE CLINIC | Age: 64
End: 2024-06-17
Payer: COMMERCIAL

## 2024-06-17 VITALS
HEART RATE: 73 BPM | WEIGHT: 260 LBS | HEIGHT: 72 IN | SYSTOLIC BLOOD PRESSURE: 180 MMHG | DIASTOLIC BLOOD PRESSURE: 90 MMHG | TEMPERATURE: 98.4 F | OXYGEN SATURATION: 95 % | BODY MASS INDEX: 35.21 KG/M2

## 2024-06-17 DIAGNOSIS — Z12.5 PROSTATE CANCER SCREENING: ICD-10-CM

## 2024-06-17 DIAGNOSIS — G43.909 MIGRAINE WITHOUT STATUS MIGRAINOSUS, NOT INTRACTABLE, UNSPECIFIED MIGRAINE TYPE: ICD-10-CM

## 2024-06-17 DIAGNOSIS — R00.2 PALPITATIONS: ICD-10-CM

## 2024-06-17 DIAGNOSIS — Z00.00 ANNUAL PHYSICAL EXAM: Primary | ICD-10-CM

## 2024-06-17 DIAGNOSIS — I10 PRIMARY HYPERTENSION: ICD-10-CM

## 2024-06-17 DIAGNOSIS — Z00.00 PREVENTATIVE HEALTH CARE: ICD-10-CM

## 2024-06-17 PROCEDURE — 1160F RVW MEDS BY RX/DR IN RCRD: CPT | Performed by: NURSE PRACTITIONER

## 2024-06-17 PROCEDURE — 1125F AMNT PAIN NOTED PAIN PRSNT: CPT | Performed by: NURSE PRACTITIONER

## 2024-06-17 PROCEDURE — 3078F DIAST BP <80 MM HG: CPT | Performed by: NURSE PRACTITIONER

## 2024-06-17 PROCEDURE — 99396 PREV VISIT EST AGE 40-64: CPT | Performed by: NURSE PRACTITIONER

## 2024-06-17 PROCEDURE — 3077F SYST BP >= 140 MM HG: CPT | Performed by: NURSE PRACTITIONER

## 2024-06-17 PROCEDURE — 1159F MED LIST DOCD IN RCRD: CPT | Performed by: NURSE PRACTITIONER

## 2024-06-17 RX ORDER — HYDROCHLOROTHIAZIDE 12.5 MG/1
12.5 TABLET ORAL DAILY
Qty: 30 TABLET | Refills: 1 | Status: SHIPPED | OUTPATIENT
Start: 2024-06-17

## 2024-06-17 RX ORDER — VALSARTAN 320 MG/1
320 TABLET ORAL DAILY
Qty: 90 TABLET | Refills: 1 | Status: SHIPPED | OUTPATIENT
Start: 2024-06-17

## 2024-06-17 RX ORDER — SUMATRIPTAN 50 MG/1
TABLET, FILM COATED ORAL
Qty: 9 TABLET | Refills: 1 | Status: SHIPPED | OUTPATIENT
Start: 2024-06-17

## 2024-06-17 NOTE — ASSESSMENT & PLAN NOTE
He is currently taking valsartan 320 mg daily.  His blood pressure is not controlled, so we will add HCTZ 12.5 mg daily.  Will check him in 1 month to see what his blood pressure readings are.  Will also refer to cardiology due to the palpitations.

## 2024-06-19 ENCOUNTER — LAB (OUTPATIENT)
Dept: LAB | Facility: HOSPITAL | Age: 64
End: 2024-06-19
Payer: COMMERCIAL

## 2024-06-19 DIAGNOSIS — Z00.00 ANNUAL PHYSICAL EXAM: ICD-10-CM

## 2024-06-19 DIAGNOSIS — Z12.5 PROSTATE CANCER SCREENING: ICD-10-CM

## 2024-06-19 DIAGNOSIS — Z00.00 PREVENTATIVE HEALTH CARE: ICD-10-CM

## 2024-06-19 LAB
ALBUMIN SERPL-MCNC: 4.3 G/DL (ref 3.5–5.2)
ALBUMIN/GLOB SERPL: 1.3 G/DL
ALP SERPL-CCNC: 56 U/L (ref 39–117)
ALT SERPL W P-5'-P-CCNC: 32 U/L (ref 1–41)
ANION GAP SERPL CALCULATED.3IONS-SCNC: 9.9 MMOL/L (ref 5–15)
AST SERPL-CCNC: 26 U/L (ref 1–40)
BILIRUB SERPL-MCNC: 0.3 MG/DL (ref 0–1.2)
BUN SERPL-MCNC: 11 MG/DL (ref 8–23)
BUN/CREAT SERPL: 15.1 (ref 7–25)
CALCIUM SPEC-SCNC: 9.7 MG/DL (ref 8.6–10.5)
CHLORIDE SERPL-SCNC: 102 MMOL/L (ref 98–107)
CHOLEST SERPL-MCNC: 236 MG/DL (ref 0–200)
CO2 SERPL-SCNC: 25.1 MMOL/L (ref 22–29)
CREAT SERPL-MCNC: 0.73 MG/DL (ref 0.76–1.27)
DEPRECATED RDW RBC AUTO: 41 FL (ref 37–54)
EGFRCR SERPLBLD CKD-EPI 2021: 103.5 ML/MIN/1.73
ERYTHROCYTE [DISTWIDTH] IN BLOOD BY AUTOMATED COUNT: 12.4 % (ref 12.3–15.4)
GLOBULIN UR ELPH-MCNC: 3.2 GM/DL
GLUCOSE SERPL-MCNC: 87 MG/DL (ref 65–99)
HBA1C MFR BLD: 5.8 % (ref 4.8–5.6)
HCT VFR BLD AUTO: 46.8 % (ref 37.5–51)
HDLC SERPL-MCNC: 36 MG/DL (ref 40–60)
HGB BLD-MCNC: 15.2 G/DL (ref 13–17.7)
LDLC SERPL CALC-MCNC: 163 MG/DL (ref 0–100)
LDLC/HDLC SERPL: 4.44 {RATIO}
MCH RBC QN AUTO: 29.2 PG (ref 26.6–33)
MCHC RBC AUTO-ENTMCNC: 32.5 G/DL (ref 31.5–35.7)
MCV RBC AUTO: 90 FL (ref 79–97)
PLATELET # BLD AUTO: 228 10*3/MM3 (ref 140–450)
PMV BLD AUTO: 9.3 FL (ref 6–12)
POTASSIUM SERPL-SCNC: 5.2 MMOL/L (ref 3.5–5.2)
PROT SERPL-MCNC: 7.5 G/DL (ref 6–8.5)
PSA SERPL-MCNC: 2.59 NG/ML (ref 0–4)
RBC # BLD AUTO: 5.2 10*6/MM3 (ref 4.14–5.8)
SODIUM SERPL-SCNC: 137 MMOL/L (ref 136–145)
TRIGL SERPL-MCNC: 200 MG/DL (ref 0–150)
TSH SERPL DL<=0.05 MIU/L-ACNC: 0.52 UIU/ML (ref 0.27–4.2)
VLDLC SERPL-MCNC: 37 MG/DL (ref 5–40)
WBC NRBC COR # BLD AUTO: 6.41 10*3/MM3 (ref 3.4–10.8)

## 2024-06-19 PROCEDURE — 36415 COLL VENOUS BLD VENIPUNCTURE: CPT

## 2024-06-19 PROCEDURE — 83036 HEMOGLOBIN GLYCOSYLATED A1C: CPT

## 2024-06-19 PROCEDURE — G0103 PSA SCREENING: HCPCS

## 2024-06-19 PROCEDURE — 80053 COMPREHEN METABOLIC PANEL: CPT

## 2024-06-19 PROCEDURE — 80061 LIPID PANEL: CPT

## 2024-06-19 PROCEDURE — 85027 COMPLETE CBC AUTOMATED: CPT

## 2024-06-19 PROCEDURE — 84443 ASSAY THYROID STIM HORMONE: CPT

## 2024-07-02 PROBLEM — E78.2 MIXED HYPERLIPIDEMIA: Status: ACTIVE | Noted: 2024-07-02

## 2024-07-02 NOTE — PROGRESS NOTES
"Chief Complaint  Hypertension (1m follow up/)    Subjective          Philip Calloway presents to [facility]  History of Present Illness  HTN: At his last office visit, his blood pressure was running high, so in addition to his valsartan 320 mg daily, HCTZ 12.5 mg daily was added. He has not been checking his BP at home. He has been having several bad H/A. He denies blurry vision, chest pain, and pedal edema.     HLD: His cholesterol was high after his last lab draw last month.  It was recommended to start atorvastatin 40 mg daily.    The 10-year ASCVD risk score (Lukas BARTON, et al., 2019) is: 26.3%    Values used to calculate the score:      Age: 64 years      Sex: Male      Is Non- : No      Diabetic: No      Tobacco smoker: No      Systolic Blood Pressure: 171 mmHg      Is BP treated: Yes      HDL Cholesterol: 36 mg/dL      Total Cholesterol: 236 mg/dL       Objective   Vital Signs:   /85 (BP Location: Left arm, Patient Position: Sitting)   Ht 182.9 cm (72\")   Wt 120 kg (264 lb 3.2 oz)   SpO2 98% Comment: room air  BMI 35.83 kg/m²     Physical Exam  Constitutional:       General: He is not in acute distress.     Appearance: Normal appearance.   HENT:      Head: Normocephalic.   Eyes:      Pupils: Pupils are equal, round, and reactive to light.      Visual Fields: Right eye visual fields normal and left eye visual fields normal.   Neck:      Trachea: Trachea normal.   Cardiovascular:      Rate and Rhythm: Normal rate and regular rhythm.      Heart sounds: Normal heart sounds.   Pulmonary:      Effort: Pulmonary effort is normal.      Breath sounds: Normal breath sounds and air entry.   Musculoskeletal:      Right lower leg: No edema.      Left lower leg: No edema.   Skin:     General: Skin is warm and dry.   Neurological:      Mental Status: He is alert and oriented to person, place, and time.   Psychiatric:         Mood and Affect: Mood and affect " normal.         Behavior: Behavior normal.         Thought Content: Thought content normal.        Result Review :   The following data was reviewed by: TONY Burger on 07/17/2024:                  Assessment and Plan    Diagnoses and all orders for this visit:    1. Primary hypertension (Primary)  Assessment & Plan:  His blood pressure is high in office today, but he is not taking his HCTZ yet.  He usually takes it midday.  I do recommend that he take his hydrochlorothiazide at the same time as his valsartan.  I do recommend that he check his blood pressure approximately an hour after taking the medication to see what it runs at home.  Will recheck him in 3 months.    Orders:  -     hydroCHLOROthiazide 12.5 MG tablet; Take 1 tablet by mouth Daily.  Dispense: 90 tablet; Refill: 0    2. Mixed hyperlipidemia  Assessment & Plan:   He was hesitant to start a statin due to potential side effects, but he is agreeable to start pravastatin, so we will send that in.    Orders:  -     pravastatin (Pravachol) 20 MG tablet; Take 1 tablet by mouth Daily.  Dispense: 90 tablet; Refill: 1    3. Screening for lung cancer  -      CT Chest Low Dose Cancer Screening WO; Future    4. Colon cancer screening  -     Cologuard - Stool, Per Rectum; Future          Follow Up   Return in about 3 months (around 10/17/2024).  Patient was given instructions and counseling regarding his condition or for health maintenance advice. Please see specific information pulled into the AVS if appropriate.

## 2024-07-17 ENCOUNTER — OFFICE VISIT (OUTPATIENT)
Dept: FAMILY MEDICINE CLINIC | Age: 64
End: 2024-07-17
Payer: COMMERCIAL

## 2024-07-17 VITALS
DIASTOLIC BLOOD PRESSURE: 85 MMHG | OXYGEN SATURATION: 98 % | BODY MASS INDEX: 35.78 KG/M2 | HEIGHT: 72 IN | SYSTOLIC BLOOD PRESSURE: 171 MMHG | WEIGHT: 264.2 LBS

## 2024-07-17 DIAGNOSIS — E78.2 MIXED HYPERLIPIDEMIA: ICD-10-CM

## 2024-07-17 DIAGNOSIS — Z12.11 COLON CANCER SCREENING: ICD-10-CM

## 2024-07-17 DIAGNOSIS — I10 PRIMARY HYPERTENSION: Primary | ICD-10-CM

## 2024-07-17 DIAGNOSIS — Z12.2 SCREENING FOR LUNG CANCER: ICD-10-CM

## 2024-07-17 PROCEDURE — 1125F AMNT PAIN NOTED PAIN PRSNT: CPT | Performed by: NURSE PRACTITIONER

## 2024-07-17 PROCEDURE — 99214 OFFICE O/P EST MOD 30 MIN: CPT | Performed by: NURSE PRACTITIONER

## 2024-07-17 PROCEDURE — 3079F DIAST BP 80-89 MM HG: CPT | Performed by: NURSE PRACTITIONER

## 2024-07-17 PROCEDURE — 1160F RVW MEDS BY RX/DR IN RCRD: CPT | Performed by: NURSE PRACTITIONER

## 2024-07-17 PROCEDURE — 3077F SYST BP >= 140 MM HG: CPT | Performed by: NURSE PRACTITIONER

## 2024-07-17 PROCEDURE — 1159F MED LIST DOCD IN RCRD: CPT | Performed by: NURSE PRACTITIONER

## 2024-07-17 RX ORDER — HYDROCHLOROTHIAZIDE 12.5 MG/1
12.5 TABLET ORAL DAILY
Qty: 90 TABLET | Refills: 0 | Status: SHIPPED | OUTPATIENT
Start: 2024-07-17

## 2024-07-17 RX ORDER — PRAVASTATIN SODIUM 20 MG
20 TABLET ORAL DAILY
Qty: 90 TABLET | Refills: 1 | Status: SHIPPED | OUTPATIENT
Start: 2024-07-17

## 2024-07-17 NOTE — ASSESSMENT & PLAN NOTE
He was hesitant to start a statin due to potential side effects, but he is agreeable to start pravastatin, so we will send that in.

## 2024-07-17 NOTE — ASSESSMENT & PLAN NOTE
His blood pressure is high in office today, but he is not taking his HCTZ yet.  He usually takes it midday.  I do recommend that he take his hydrochlorothiazide at the same time as his valsartan.  I do recommend that he check his blood pressure approximately an hour after taking the medication to see what it runs at home.  Will recheck him in 3 months.

## 2024-07-30 ENCOUNTER — HOSPITAL ENCOUNTER (OUTPATIENT)
Dept: CT IMAGING | Facility: HOSPITAL | Age: 64
Discharge: HOME | End: 2024-07-30
Admitting: NURSE PRACTITIONER
Payer: COMMERCIAL

## 2024-07-30 DIAGNOSIS — Z12.2 SCREENING FOR LUNG CANCER: ICD-10-CM

## 2024-07-30 PROCEDURE — 71271 CT THORAX LUNG CANCER SCR C-: CPT

## 2024-08-19 DIAGNOSIS — G43.909 MIGRAINE WITHOUT STATUS MIGRAINOSUS, NOT INTRACTABLE, UNSPECIFIED MIGRAINE TYPE: ICD-10-CM

## 2024-08-19 DIAGNOSIS — I10 PRIMARY HYPERTENSION: ICD-10-CM

## 2024-08-19 RX ORDER — HYDROCHLOROTHIAZIDE 12.5 MG/1
12.5 TABLET ORAL DAILY
Qty: 30 TABLET | Refills: 1 | OUTPATIENT
Start: 2024-08-19

## 2024-08-19 RX ORDER — SUMATRIPTAN 50 MG/1
TABLET, FILM COATED ORAL
Qty: 9 TABLET | Refills: 1 | OUTPATIENT
Start: 2024-08-19

## 2024-09-11 ENCOUNTER — OFFICE VISIT (OUTPATIENT)
Dept: CARDIOLOGY | Facility: CLINIC | Age: 64
End: 2024-09-11
Payer: COMMERCIAL

## 2024-09-11 VITALS
WEIGHT: 264 LBS | HEART RATE: 61 BPM | SYSTOLIC BLOOD PRESSURE: 120 MMHG | HEIGHT: 72 IN | DIASTOLIC BLOOD PRESSURE: 79 MMHG | BODY MASS INDEX: 35.76 KG/M2

## 2024-09-11 DIAGNOSIS — I10 PRIMARY HYPERTENSION: Primary | ICD-10-CM

## 2024-09-11 DIAGNOSIS — R00.2 PALPITATIONS: ICD-10-CM

## 2024-09-11 DIAGNOSIS — E78.2 MIXED HYPERLIPIDEMIA: ICD-10-CM

## 2024-09-11 PROCEDURE — 3078F DIAST BP <80 MM HG: CPT | Performed by: INTERNAL MEDICINE

## 2024-09-11 PROCEDURE — 1160F RVW MEDS BY RX/DR IN RCRD: CPT | Performed by: INTERNAL MEDICINE

## 2024-09-11 PROCEDURE — 1159F MED LIST DOCD IN RCRD: CPT | Performed by: INTERNAL MEDICINE

## 2024-09-11 PROCEDURE — 3074F SYST BP LT 130 MM HG: CPT | Performed by: INTERNAL MEDICINE

## 2024-09-11 PROCEDURE — 99204 OFFICE O/P NEW MOD 45 MIN: CPT | Performed by: INTERNAL MEDICINE

## 2024-09-11 NOTE — ASSESSMENT & PLAN NOTE
Lipid panel done during the past year showed LDL above 160 with an elevated triglycerides and total cholesterol as well.  Of note, recent low-dose CT scan of the chest showed coronary artery calcifications.  These findings were discussed in detail with the patient.  He will need a statin and goal LDL is < 70.  Extensively discussed regarding benefits of statin therapy in the situation.  He has a prescription for pravastatin but has not started taking it.  After discussion patient agreed to think about it.

## 2024-09-11 NOTE — ASSESSMENT & PLAN NOTE
Blood pressure is well-controlled in the office today.  However they were not well-controlled during recent PCP visits and also per patient's home blood pressure log.  He is currently taking hydrochlorothiazide on a regular basis for the past 1 month, along with valsartan.  We discussed the option of increasing hydrochlorothiazide to the maximum dose of 25 mg daily.  Patient prefers not to change it at this time but will keep monitoring blood pressure at home.  He will call us back if systolic blood pressure remains above 150 mmHg most of the time.  He is already following a low-sodium diet.

## 2024-09-11 NOTE — PROGRESS NOTES
CARDIOLOGY INITIAL CONSULT NOTE        Chief Complaint  Establish Care and Palpitations    Subjective            Philip Calloway presents to [facility] CARDIOLOGY  History of Present Illness    This is a 70-year-old male with hypertension, hyperlipidemia, spondylolisthesis of the lumbar region, who was referred for cardiac evaluation due to palpitations.  Symptoms are described as a feeling of heart stopping for a fraction of a second, at times skipped beats.  Symptoms randomly happen a few times in a day, approximately once or twice a week.  There is no associated dizziness, shortness of breath or chest pain.  No syncopal episodes.  As part of workup, he had a Holter monitor study which showed isolated PACs without any arrhythmias.  Per patient, symptoms are less frequent and random at this time.    Patient is also concerned about high blood pressure.  He brought a log of home blood pressure readings which showed systolic blood pressure mostly above 150 mmHg.  Hydrochlorothiazide was added to the regimen approximately a month back.  He has been on valsartan for almost 15 years.  Recently, pravastatin was prescribed for uncontrolled lipid levels, however patient chose not to take the medication due to concerns about side effects.  Overall, he is active at baseline.    Previous cardiac workup including echocardiogram and stress test done in 2020 as part of preoperative evaluation before back surgery.      Past History:    Medical History:  Past Medical History:   Diagnosis Date    Anxiety     Arthritis     OSTEO    At risk for obstructive sleep apnea     STOP BANG 7    Balance problem     SINCE BACK PROBLEMS    Cataract     NOT RIPE    Headache     History of migraine     History of palpitations     Hypertension     Low back pain     Lumbar disc disease     L3-L5    McArdle's disease     HISTORY OF. HAS NOT HAD ANY ISSUES WITH ANESTHESIA IN THE PAST    Numbness and tingling     TEMO LEGS    PONV  (postoperative nausea and vomiting)     Scoliosis     Spinal stenosis of lumbar region with neurogenic claudication     Vision disorder     LEGALLY BLIND LEFT EYE. CONGENITAL       Surgical History: has a past surgical history that includes Hernia repair (12/2016); Back surgery (04/1998); Spine surgery; Epidural block injection; lumbar laminectomy with fusion (N/A, 3/2/2020); and lumbar laminectomy with fusion (N/A, 3/2/2020).     Family History: family history includes Cancer in his father; Diabetes in his mother.     Social History: reports that he quit smoking about 5 years ago. His smoking use included cigarettes and cigars. He started smoking about 25 years ago. He has a 20 pack-year smoking history. He has never used smokeless tobacco. He reports current alcohol use. He reports that he does not use drugs.    Allergies: Patient has no known allergies.    Current Outpatient Medications on File Prior to Visit   Medication Sig    furosemide (LASIX) 40 MG tablet Take 1 tablet by mouth As Needed.    hydroCHLOROthiazide 12.5 MG tablet Take 1 tablet by mouth Daily.    lidocaine (LIDODERM) 5 % apply one PATCH topically EVERY DAY - LEAVE ON FOR 12 hours AND 12 hours off    oxyCODONE-acetaminophen (PERCOCET)  MG per tablet Take 1-2 tabs by mouth every 4 hours as needed for pain    SUMAtriptan (IMITREX) 50 MG tablet Take one tablet at onset of headache. May repeat dose one time in 2 hours if headache not relieved.    valsartan (DIOVAN) 320 MG tablet Take 1 tablet by mouth Daily.    pravastatin (Pravachol) 20 MG tablet Take 1 tablet by mouth Daily.     No current facility-administered medications on file prior to visit.          Review of Systems   Constitutional:  Negative for fatigue, unexpected weight gain and unexpected weight loss.   Eyes:  Negative for double vision.   Respiratory:  Negative for cough, shortness of breath and wheezing.    Cardiovascular:  Positive for palpitations. Negative for chest pain and  "leg swelling.   Gastrointestinal:  Negative for abdominal pain, nausea and vomiting.   Endocrine: Negative for cold intolerance, heat intolerance, polydipsia and polyuria.   Musculoskeletal:  Negative for arthralgias and back pain.   Skin:  Negative for color change.   Neurological:  Negative for dizziness, syncope, weakness and headache.   Hematological:  Does not bruise/bleed easily.        Objective     /79   Pulse 61   Ht 182.9 cm (72\")   Wt 120 kg (264 lb)   BMI 35.80 kg/m²       Physical Exam    General : Alert, awake, no acute distress  Neck : Supple, no carotid bruit, no jugular venous distention  CVS : Regular rate and rhythm, no murmur, rubs or gallops  Lungs: Clear to auscultation bilaterally, no crackles or rhonchi  Abdomen: Soft, nontender, bowel sounds heard in all 4 quadrants  Extremities: Warm, well-perfused, no pedal edema    Result Review :     The following data was reviewed by: Jose Shabazz MD on 09/11/2024:    CMP          12/14/2023    09:42 6/19/2024    10:34   CMP   Glucose 96  87    BUN 13  11    Creatinine 0.77  0.73    EGFR 101.9  103.5    Sodium 139  137    Potassium 4.5  5.2    Chloride 104  102    Calcium 9.5  9.7    Total Protein 7.7  7.5    Albumin 4.4  4.3    Globulin 3.3  3.2    Total Bilirubin 0.3  0.3    Alkaline Phosphatase 61  56    AST (SGOT) 28  26    ALT (SGPT) 36  32    Albumin/Globulin Ratio 1.3  1.3    BUN/Creatinine Ratio 16.9  15.1    Anion Gap 9.9  9.9      CBC          12/14/2023    09:42 6/19/2024    10:34   CBC   WBC 7.81  6.41    RBC 5.05  5.20    Hemoglobin 15.0  15.2    Hematocrit 45.8  46.8    MCV 90.7  90.0    MCH 29.7  29.2    MCHC 32.8  32.5    RDW 12.1  12.4    Platelets 263  228      TSH          6/19/2024    10:34   TSH   TSH 0.523      Lipid Panel          12/14/2023    09:42 6/19/2024    10:34   Lipid Panel   Total Cholesterol 261  236    Triglycerides 174  200    HDL Cholesterol 33  36    VLDL Cholesterol 33  37    LDL Cholesterol  195  " 163    LDL/HDL Ratio 5.85  4.44           Data reviewed: Cardiology studies        Results for orders placed during the hospital encounter of 02/20/20    Adult Transthoracic Echo Complete W/ Cont if Necessary Per Protocol    Interpretation Summary  · Left ventricular systolic function is normal. Calculated EF = 61%. Estimated EF was in agreement with the calculated EF. Estimated EF = 61%. Normal left ventricular cavity size and wall thickness noted. All left ventricular wall segments contract normally. Left ventricular diastolic function is normal.  · Trace tricuspid valve regurgitation is present. Estimated right ventricular systolic pressure from tricuspid regurgitation is normal (<35 mmHg). Calculated right ventricular systolic pressure from tricuspid regurgitation is 23 mmHg.  · Mild dilation of the proximal aorta is present. The ascending aorta measures 3.8cm      Results for orders placed during the hospital encounter of 02/20/20    Stress Test With Pet Myocardial Perfusion (MULTI STUDY, REST AND STRESS)    Interpretation Summary  · Myocardial perfusion imaging indicates a normal myocardial perfusion study with no evidence of ischemia.  · Left ventricular ejection fraction is normal (Calculated EF = 65%).  · Impressions are consistent with a low risk study.    48-hour Holter monitor study done on 6/20/2024 showed      Patient was monitored for 1 day, 21 hours and 49 minutes.    Lowest heart rate was 46 bpm, average heart rate was 62 bpm, maximum heart rate was 117 bpm.    No PVCs.  Rare PAC, triplet.    No patient activated events.    No significant abnormal heart rhythms noted.               Assessment and Plan        Diagnoses and all orders for this visit:    1. Primary hypertension (Primary)  Assessment & Plan:  Blood pressure is well-controlled in the office today.  However they were not well-controlled during recent PCP visits and also per patient's home blood pressure log.  He is currently taking  hydrochlorothiazide on a regular basis for the past 1 month, along with valsartan.  We discussed the option of increasing hydrochlorothiazide to the maximum dose of 25 mg daily.  Patient prefers not to change it at this time but will keep monitoring blood pressure at home.  He will call us back if systolic blood pressure remains above 150 mmHg most of the time.  He is already following a low-sodium diet.      2. Mixed hyperlipidemia  Assessment & Plan:  Lipid panel done during the past year showed LDL above 160 with an elevated triglycerides and total cholesterol as well.  Of note, recent low-dose CT scan of the chest showed coronary artery calcifications.  These findings were discussed in detail with the patient.  He will need a statin and goal LDL is < 70.  Extensively discussed regarding benefits of statin therapy in the situation.  He has a prescription for pravastatin but has not started taking it.  After discussion patient agreed to think about it.      3. Palpitations  Assessment & Plan:  Symptoms are random in nature and per description these are likely symptomatic ectopic beats.  Recent Holter monitor study also showed similar findings.  Echocardiogram done in 2020 showed no structural abnormalities.  Pharmacologic therapy not indicated since symptoms are minimal.  Discussed about cutting down on caffeine intake.                Follow Up     Return in about 6 months (around 3/11/2025) for Next scheduled follow up.    Patient was given instructions and counseling regarding his condition or for health maintenance advice. Please see specific information pulled into the AVS if appropriate.

## 2024-09-11 NOTE — ASSESSMENT & PLAN NOTE
Symptoms are random in nature and per description these are likely symptomatic ectopic beats.  Recent Holter monitor study also showed similar findings.  Echocardiogram done in 2020 showed no structural abnormalities.  Pharmacologic therapy not indicated since symptoms are minimal.  Discussed about cutting down on caffeine intake.

## 2024-10-07 PROBLEM — R73.03 PREDIABETES: Status: ACTIVE | Noted: 2024-10-07

## 2024-10-07 NOTE — PROGRESS NOTES
"Chief Complaint  Hypertension (3M FOLLOW UP/)    Subjective          Philip Calloway presents to [facility]  History of Present Illness  HTN: He returns for follow-up for his blood pressure.  He is taking valsartan 320 mg daily and HCTZ 12.5 mg daily.  He did see the cardiologist since his last office visit.  His blood pressure was good at the cardiologist office, but his home readings were not.  The cardiologist suggested increasing his HCTZ to 25 mg daily, but he wanted to hold off at that time. His BP is running 140s-150s at home. Since taking HCTZ daily, he hasn't had a migraine.     HLD: The cardiologist discussed his coronary artery calcifications and recommended a statin with the LDL goal less than 70.  He was going to think about starting it. He is going to try to make changes with his diet.     Palpitations: Cardiologist believes they are symptomatic topic beats.  He recommended cutting down on caffeine intake.    Prediabetes: His A1c was 5.8.  It was recommended to undergo lifestyle modifications.      Objective   Vital Signs:   /71 (BP Location: Left arm, Patient Position: Sitting)   Pulse 71   Ht 182.9 cm (72\")   Wt 122 kg (268 lb 6.4 oz)   SpO2 95% Comment: ROOM AIR  BMI 36.40 kg/m²     Physical Exam  Constitutional:       General: He is not in acute distress.     Appearance: Normal appearance. He is obese.   HENT:      Head: Normocephalic.   Eyes:      Pupils: Pupils are equal, round, and reactive to light.      Visual Fields: Right eye visual fields normal and left eye visual fields normal.   Neck:      Trachea: Trachea normal.   Cardiovascular:      Rate and Rhythm: Normal rate and regular rhythm.      Heart sounds: Normal heart sounds.   Pulmonary:      Effort: Pulmonary effort is normal.      Breath sounds: Normal breath sounds and air entry.   Musculoskeletal:      Right lower leg: No edema.      Left lower leg: No edema.   Skin:     General: Skin is " warm and dry.   Neurological:      Mental Status: He is alert and oriented to person, place, and time.   Psychiatric:         Mood and Affect: Mood and affect normal.         Behavior: Behavior normal.         Thought Content: Thought content normal.        Result Review :   The following data was reviewed by: TONY Burger on 10/15/2024:                  Assessment and Plan    Diagnoses and all orders for this visit:    1. Primary hypertension (Primary)  Assessment & Plan:  After reviewing his home BP readings, I recommend increasing his HCTZ to 25 mg, and he is agreeable.  He is to continue the valsartan at 320 mg daily.    Orders:  -     CBC (No Diff); Future  -     Comprehensive Metabolic Panel; Future  -     Lipid Panel; Future  -     hydroCHLOROthiazide 25 MG tablet; Take 1 tablet by mouth Daily.  Dispense: 90 tablet; Refill: 1    2. Mixed hyperlipidemia  Assessment & Plan:   He is going to work hard on lifestyle modifications.  He is not interested in starting medication at this time.  Will check his cholesterol.    Orders:  -     CBC (No Diff); Future  -     Comprehensive Metabolic Panel; Future  -     Lipid Panel; Future    3. Palpitations    4. Prediabetes  Assessment & Plan:  Continue to work on lifestyle modifications.  Will check an A1c.    Orders:  -     CBC (No Diff); Future  -     Comprehensive Metabolic Panel; Future  -     Lipid Panel; Future  -     Hemoglobin A1c; Future                Follow Up   Return in about 6 months (around 4/15/2025).  Patient was given instructions and counseling regarding his condition or for health maintenance advice. Please see specific information pulled into the AVS if appropriate.

## 2024-10-15 ENCOUNTER — OFFICE VISIT (OUTPATIENT)
Dept: FAMILY MEDICINE CLINIC | Age: 64
End: 2024-10-15
Payer: COMMERCIAL

## 2024-10-15 VITALS
BODY MASS INDEX: 36.35 KG/M2 | WEIGHT: 268.4 LBS | DIASTOLIC BLOOD PRESSURE: 71 MMHG | HEIGHT: 72 IN | HEART RATE: 71 BPM | SYSTOLIC BLOOD PRESSURE: 144 MMHG | OXYGEN SATURATION: 95 %

## 2024-10-15 DIAGNOSIS — I10 PRIMARY HYPERTENSION: Primary | ICD-10-CM

## 2024-10-15 DIAGNOSIS — R00.2 PALPITATIONS: ICD-10-CM

## 2024-10-15 DIAGNOSIS — E78.2 MIXED HYPERLIPIDEMIA: ICD-10-CM

## 2024-10-15 DIAGNOSIS — R73.03 PREDIABETES: ICD-10-CM

## 2024-10-15 PROCEDURE — 1159F MED LIST DOCD IN RCRD: CPT | Performed by: NURSE PRACTITIONER

## 2024-10-15 PROCEDURE — 3077F SYST BP >= 140 MM HG: CPT | Performed by: NURSE PRACTITIONER

## 2024-10-15 PROCEDURE — 1160F RVW MEDS BY RX/DR IN RCRD: CPT | Performed by: NURSE PRACTITIONER

## 2024-10-15 PROCEDURE — 3078F DIAST BP <80 MM HG: CPT | Performed by: NURSE PRACTITIONER

## 2024-10-15 PROCEDURE — 1125F AMNT PAIN NOTED PAIN PRSNT: CPT | Performed by: NURSE PRACTITIONER

## 2024-10-15 PROCEDURE — 99214 OFFICE O/P EST MOD 30 MIN: CPT | Performed by: NURSE PRACTITIONER

## 2024-10-15 RX ORDER — HYDROCHLOROTHIAZIDE 25 MG/1
25 TABLET ORAL DAILY
Qty: 90 TABLET | Refills: 1 | Status: SHIPPED | OUTPATIENT
Start: 2024-10-15

## 2024-10-15 NOTE — ASSESSMENT & PLAN NOTE
He is going to work hard on lifestyle modifications.  He is not interested in starting medication at this time.  Will check his cholesterol.

## 2024-10-15 NOTE — ASSESSMENT & PLAN NOTE
After reviewing his home BP readings, I recommend increasing his HCTZ to 25 mg, and he is agreeable.  He is to continue the valsartan at 320 mg daily.

## 2024-10-28 ENCOUNTER — LAB (OUTPATIENT)
Dept: LAB | Facility: HOSPITAL | Age: 64
End: 2024-10-28
Payer: COMMERCIAL

## 2024-10-28 DIAGNOSIS — E78.2 MIXED HYPERLIPIDEMIA: ICD-10-CM

## 2024-10-28 DIAGNOSIS — I10 PRIMARY HYPERTENSION: ICD-10-CM

## 2024-10-28 DIAGNOSIS — R73.03 PREDIABETES: ICD-10-CM

## 2024-10-28 LAB
ALBUMIN SERPL-MCNC: 4.2 G/DL (ref 3.5–5.2)
ALBUMIN/GLOB SERPL: 1.5 G/DL
ALP SERPL-CCNC: 66 U/L (ref 39–117)
ALT SERPL W P-5'-P-CCNC: 45 U/L (ref 1–41)
ANION GAP SERPL CALCULATED.3IONS-SCNC: 6.8 MMOL/L (ref 5–15)
AST SERPL-CCNC: 39 U/L (ref 1–40)
BILIRUB SERPL-MCNC: 0.4 MG/DL (ref 0–1.2)
BUN SERPL-MCNC: 11 MG/DL (ref 8–23)
BUN/CREAT SERPL: 15.1 (ref 7–25)
CALCIUM SPEC-SCNC: 9.1 MG/DL (ref 8.6–10.5)
CHLORIDE SERPL-SCNC: 100 MMOL/L (ref 98–107)
CHOLEST SERPL-MCNC: 238 MG/DL (ref 0–200)
CO2 SERPL-SCNC: 30.2 MMOL/L (ref 22–29)
CREAT SERPL-MCNC: 0.73 MG/DL (ref 0.76–1.27)
DEPRECATED RDW RBC AUTO: 40.4 FL (ref 37–54)
EGFRCR SERPLBLD CKD-EPI 2021: 102.9 ML/MIN/1.73
ERYTHROCYTE [DISTWIDTH] IN BLOOD BY AUTOMATED COUNT: 12.3 % (ref 12.3–15.4)
GLOBULIN UR ELPH-MCNC: 2.8 GM/DL
GLUCOSE SERPL-MCNC: 99 MG/DL (ref 65–99)
HBA1C MFR BLD: 5.8 % (ref 4.8–5.6)
HCT VFR BLD AUTO: 44.5 % (ref 37.5–51)
HDLC SERPL-MCNC: 30 MG/DL (ref 40–60)
HGB BLD-MCNC: 14.7 G/DL (ref 13–17.7)
LDLC SERPL CALC-MCNC: 169 MG/DL (ref 0–100)
LDLC/HDLC SERPL: 5.56 {RATIO}
MCH RBC QN AUTO: 29.5 PG (ref 26.6–33)
MCHC RBC AUTO-ENTMCNC: 33 G/DL (ref 31.5–35.7)
MCV RBC AUTO: 89.4 FL (ref 79–97)
PLATELET # BLD AUTO: 264 10*3/MM3 (ref 140–450)
PMV BLD AUTO: 9.2 FL (ref 6–12)
POTASSIUM SERPL-SCNC: 3.8 MMOL/L (ref 3.5–5.2)
PROT SERPL-MCNC: 7 G/DL (ref 6–8.5)
RBC # BLD AUTO: 4.98 10*6/MM3 (ref 4.14–5.8)
SODIUM SERPL-SCNC: 137 MMOL/L (ref 136–145)
TRIGL SERPL-MCNC: 206 MG/DL (ref 0–150)
VLDLC SERPL-MCNC: 39 MG/DL (ref 5–40)
WBC NRBC COR # BLD AUTO: 6.91 10*3/MM3 (ref 3.4–10.8)

## 2024-10-28 PROCEDURE — 36415 COLL VENOUS BLD VENIPUNCTURE: CPT

## 2024-10-28 PROCEDURE — 85027 COMPLETE CBC AUTOMATED: CPT

## 2024-10-28 PROCEDURE — 83036 HEMOGLOBIN GLYCOSYLATED A1C: CPT

## 2024-10-28 PROCEDURE — 80053 COMPREHEN METABOLIC PANEL: CPT

## 2024-10-28 PROCEDURE — 80061 LIPID PANEL: CPT

## 2025-02-04 RX ORDER — PRAVASTATIN SODIUM 20 MG
20 TABLET ORAL DAILY
Qty: 90 TABLET | Refills: 0 | Status: SHIPPED | OUTPATIENT
Start: 2025-02-04

## 2025-02-05 ENCOUNTER — TRANSCRIBE ORDERS (OUTPATIENT)
Dept: ADMINISTRATIVE | Facility: HOSPITAL | Age: 65
End: 2025-02-05
Payer: COMMERCIAL

## 2025-02-05 ENCOUNTER — HOSPITAL ENCOUNTER (OUTPATIENT)
Dept: GENERAL RADIOLOGY | Facility: HOSPITAL | Age: 65
Discharge: HOME | End: 2025-02-05
Admitting: PHYSICIAN ASSISTANT
Payer: COMMERCIAL

## 2025-02-05 DIAGNOSIS — M54.14 RADICULITIS, THORACIC: ICD-10-CM

## 2025-02-05 DIAGNOSIS — Z98.1 STATUS POST ARTHRODESIS: Primary | ICD-10-CM

## 2025-02-05 DIAGNOSIS — Z98.1 STATUS POST ARTHRODESIS: ICD-10-CM

## 2025-02-05 PROCEDURE — 72100 X-RAY EXAM L-S SPINE 2/3 VWS: CPT

## 2025-02-05 PROCEDURE — 72070 X-RAY EXAM THORAC SPINE 2VWS: CPT

## 2025-03-04 DIAGNOSIS — I10 PRIMARY HYPERTENSION: ICD-10-CM

## 2025-03-04 RX ORDER — VALSARTAN 320 MG/1
320 TABLET ORAL DAILY
Qty: 60 TABLET | Refills: 0 | Status: SHIPPED | OUTPATIENT
Start: 2025-03-04

## 2025-05-02 DIAGNOSIS — I10 PRIMARY HYPERTENSION: ICD-10-CM

## 2025-05-02 RX ORDER — VALSARTAN 320 MG/1
320 TABLET ORAL DAILY
Qty: 60 TABLET | Refills: 0 | Status: SHIPPED | OUTPATIENT
Start: 2025-05-02

## 2025-05-02 NOTE — TELEPHONE ENCOUNTER
MANTOUX TUBERCULIN (a.k.a. TB) SKIN TEST      What is Tuberculosis/TB?  Tuberculosis/TB is an infectious disease, which is spread through the air by tiny germs when people cough, sneeze, speak or sing. TB germs are very small and can remain in the air for a long period of time. TB germs most oft  en settle in your lungs, but may also settle in other organs of your body. TB germs can be present in your body without making you ill. This is called TB INFECTION. TB infection cannot be spread to other people. When your  body’s defenses become weak and can no longer control the TB germs they multiply, this is called TB DISEASE. It can take month(s) to year(s) for TB infection to become TB disease. The TB SKIN TEST can show if a person has  been “infected” by TB germs.    How is the Mantoux Tuberculin/TB skin test given?  A very small amount of a product called Purified Protein Derivative (PPD) is injected just under the top layer of the skin on the forearm. Persons who have been infected by the TB germs usually react to the test by developing swelling at the injection site. The skin test results must be read 48 to 72 hours after given. Failure to return within this time frame means the test will need to be repeated.    Side effects…  Side effects from a skin test are rare and may include itching and discomfort at the injection site and very rarely the possibility of a blister, ulceration or necrosis (dead tissue) at the site if the injection.   Rx Refill Note  Requested Prescriptions     Pending Prescriptions Disp Refills    valsartan (DIOVAN) 320 MG tablet 60 tablet 0     Sig: Take 1 tablet by mouth Daily.      Last office visit with prescribing clinician: 10/15/2024   Last telemedicine visit with prescribing clinician: Visit date not found   Next office visit with prescribing clinician: Visit date not found       Melony Ann LPN  05/02/25, 14:09 EDT    LF-3/4/25 #60, RF-0    HAS NEW PT APPT 5/6/25    ON CALL FOR VILLA

## 2025-05-05 DIAGNOSIS — I10 PRIMARY HYPERTENSION: ICD-10-CM

## 2025-05-05 RX ORDER — HYDROCHLOROTHIAZIDE 25 MG/1
25 TABLET ORAL DAILY
Qty: 90 TABLET | Refills: 1 | Status: CANCELLED | OUTPATIENT
Start: 2025-05-05

## 2025-05-05 NOTE — TELEPHONE ENCOUNTER
Rx Refill Note  Requested Prescriptions     Pending Prescriptions Disp Refills    hydroCHLOROthiazide 25 MG tablet 90 tablet 1     Sig: Take 1 tablet by mouth Daily.      Last office visit with prescribing clinician: 10/15/24  Last telemedicine visit with prescribing clinician: Visit date not found   Next office visit with prescribing clinician: Visit date not found       Melony Ann LPN  05/05/25, 14:19 EDT    LF-10/15/24 #90, RF-1    PT HAS APPT TOMORROW TO EST CARE WITH YOU

## 2025-05-06 ENCOUNTER — OFFICE VISIT (OUTPATIENT)
Dept: FAMILY MEDICINE CLINIC | Age: 65
End: 2025-05-06
Payer: COMMERCIAL

## 2025-05-06 VITALS
SYSTOLIC BLOOD PRESSURE: 119 MMHG | WEIGHT: 263.2 LBS | OXYGEN SATURATION: 96 % | DIASTOLIC BLOOD PRESSURE: 52 MMHG | BODY MASS INDEX: 35.65 KG/M2 | HEIGHT: 72 IN | HEART RATE: 78 BPM | TEMPERATURE: 98.9 F

## 2025-05-06 DIAGNOSIS — Z87.891 HISTORY OF CIGARETTE SMOKING: ICD-10-CM

## 2025-05-06 DIAGNOSIS — I10 PRIMARY HYPERTENSION: Primary | ICD-10-CM

## 2025-05-06 DIAGNOSIS — Z12.5 SCREENING FOR MALIGNANT NEOPLASM OF PROSTATE: ICD-10-CM

## 2025-05-06 DIAGNOSIS — Z13.6 SCREENING FOR CARDIOVASCULAR CONDITION: ICD-10-CM

## 2025-05-06 DIAGNOSIS — R73.03 PREDIABETES: ICD-10-CM

## 2025-05-06 DIAGNOSIS — Z12.2 SCREENING FOR LUNG CANCER: ICD-10-CM

## 2025-05-06 PROBLEM — Z98.1 HISTORY OF LUMBAR FUSION: Status: ACTIVE | Noted: 2023-08-08

## 2025-05-06 PROBLEM — G43.009 MIGRAINE WITHOUT AURA: Status: ACTIVE | Noted: 2023-02-07

## 2025-05-06 PROBLEM — M51.379 DEGENERATION OF LUMBOSACRAL INTERVERTEBRAL DISC: Status: ACTIVE | Noted: 2018-03-26

## 2025-05-06 PROBLEM — G89.29 CHRONIC PAIN: Status: ACTIVE | Noted: 2024-04-09

## 2025-05-06 PROBLEM — F40.240 CLAUSTROPHOBIA: Status: ACTIVE | Noted: 2023-08-08

## 2025-05-06 PROBLEM — M47.812 CERVICAL SPONDYLOSIS: Status: ACTIVE | Noted: 2017-08-23

## 2025-05-06 PROBLEM — Z79.4 ENCOUNTER FOR LONG-TERM (CURRENT) USE OF INSULIN: Status: ACTIVE | Noted: 2018-09-24

## 2025-05-06 PROCEDURE — 99214 OFFICE O/P EST MOD 30 MIN: CPT | Performed by: NURSE PRACTITIONER

## 2025-05-06 RX ORDER — VALSARTAN 320 MG/1
320 TABLET ORAL DAILY
Qty: 180 TABLET | Refills: 1 | Status: SHIPPED | OUTPATIENT
Start: 2025-05-06

## 2025-05-06 RX ORDER — HYDROCHLOROTHIAZIDE 25 MG/1
25 TABLET ORAL DAILY
Qty: 90 TABLET | Refills: 1 | Status: SHIPPED | OUTPATIENT
Start: 2025-05-06

## 2025-05-06 NOTE — PROGRESS NOTES
Chief Complaint  Philip Calloway presents to [facility] for Hyperlipidemia, Hypertension, and Establish Care ([facility] )    Subjective     History of Present Illness    Jose Maria presents today for follow up on hyperlipidemia.  Previous values:   Lab Results   Component Value Date    CHOL 238 (H) 10/28/2024    TRIG 206 (H) 10/28/2024    HDL 30 (L) 10/28/2024     (H) 10/28/2024      The 10-year ASCVD risk score (Lukas BARTON, et al., 2019) is: 16.6%    Values used to calculate the score:      Age: 64 years      Sex: Male      Is Non- : No      Diabetic: No      Tobacco smoker: No      Systolic Blood Pressure: 119 mmHg      Is BP treated: Yes      HDL Cholesterol: 30 mg/dL      Total Cholesterol: 238 mg/dL  He was previously prescribed pravastatin but has not been taking   Don presents today for follow up on hypertension.    Current medication treatment includes valsartan 320 mg and hydrochlorothiazide 25 mg, and is compliant with medications.   Side effects reported :  No  Home blood pressure monitoring readings reported as home BP checks: not checked  Medication changes are not recommended at this time .    Assessment and Plan     Diagnoses and all orders for this visit:    1. Primary hypertension (Primary)  Comments:  well controlled today, labs due and follow up in 6 months  Orders:  -     hydroCHLOROthiazide 25 MG tablet; Take 1 tablet by mouth Daily.  Dispense: 90 tablet; Refill: 1  -     valsartan (DIOVAN) 320 MG tablet; Take 1 tablet by mouth Daily.  Dispense: 180 tablet; Refill: 1    2. Prediabetes  Comments:  labs for monitoring  Orders:  -     Hemoglobin A1c; Future    3. Screening for cardiovascular condition  -     Lipid Panel; Future  -     Comprehensive Metabolic Panel; Future    4. History of cigarette smoking  -      CT Chest Low Dose Cancer Screening WO; Future    5. Screening for malignant neoplasm of prostate  -     PSA SCREENING;  "Future    6. Screening for lung cancer  -      CT Chest Low Dose Cancer Screening WO; Future            Follow Up   Return in about 6 months (around 11/6/2025) for Recheck, Annual physical.  Future Appointments   Date Time Provider Department Center   8/6/2025  9:30 AM [facility] 1 [facility]   11/12/2025  9:15 AM Brandy Forte APRN [facility]       New Medications Ordered This Visit   Medications    hydroCHLOROthiazide 25 MG tablet     Sig: Take 1 tablet by mouth Daily.     Dispense:  90 tablet     Refill:  1    valsartan (DIOVAN) 320 MG tablet     Sig: Take 1 tablet by mouth Daily.     Dispense:  180 tablet     Refill:  1       Medications Discontinued During This Encounter   Medication Reason    furosemide (LASIX) 40 MG tablet *Therapy completed    pravastatin (Pravachol) 20 MG tablet Non-compliance    hydroCHLOROthiazide 25 MG tablet Reorder    valsartan (DIOVAN) 320 MG tablet Reorder          Review of Systems    Objective     Vitals:    05/06/25 1402   BP: 119/52   BP Location: Left arm   Patient Position: Sitting   Cuff Size: Large Adult   Pulse: 78   Temp: 98.9 °F (37.2 °C)   TempSrc: Oral   SpO2: 96%   Weight: 119 kg (263 lb 3.2 oz)   Height: 182.9 cm (72\")         Physical Exam  Vitals reviewed.   Constitutional:       Appearance: Normal appearance. He is obese.   HENT:      Head: Normocephalic.   Eyes:      Pupils: Pupils are equal, round, and reactive to light.   Cardiovascular:      Rate and Rhythm: Normal rate and regular rhythm.      Heart sounds: No murmur heard.  Pulmonary:      Effort: Pulmonary effort is normal.      Breath sounds: Normal breath sounds.   Musculoskeletal:         General: Normal range of motion.   Neurological:      Mental Status: He is alert.   Psychiatric:         Mood and Affect: Mood normal.         Behavior: Behavior normal.               Result Review        No Known Allergies   Past Medical History:   Diagnosis Date    Anxiety     Arthritis     OSTEO    " At risk for obstructive sleep apnea     STOP BANG 7    Balance problem     SINCE BACK PROBLEMS    Cataract     NOT RIPE    Headache     History of migraine     History of palpitations     Hypertension     Low back pain     Lumbar disc disease     L3-L5    McArdle's disease     HISTORY OF. HAS NOT HAD ANY ISSUES WITH ANESTHESIA IN THE PAST    Numbness and tingling     TEMO LEGS    PONV (postoperative nausea and vomiting)     Scoliosis     Spinal stenosis of lumbar region with neurogenic claudication     Vision disorder     LEGALLY BLIND LEFT EYE. CONGENITAL     Current Outpatient Medications   Medication Sig Dispense Refill    hydroCHLOROthiazide 25 MG tablet Take 1 tablet by mouth Daily. 90 tablet 1    lidocaine (LIDODERM) 5 % apply one PATCH topically EVERY DAY - LEAVE ON FOR 12 hours AND 12 hours off      oxyCODONE-acetaminophen (PERCOCET)  MG per tablet Take 1-2 tabs by mouth every 4 hours as needed for pain 50 tablet 0    SUMAtriptan (IMITREX) 50 MG tablet Take one tablet at onset of headache. May repeat dose one time in 2 hours if headache not relieved. 9 tablet 1    valsartan (DIOVAN) 320 MG tablet Take 1 tablet by mouth Daily. 180 tablet 1     No current facility-administered medications for this visit.     Past Surgical History:   Procedure Laterality Date    BACK SURGERY  04/1998    EPIDURAL BLOCK      HERNIA REPAIR  12/2016    LUMBAR LAMINECTOMY WITH FUSION N/A 3/2/2020    Procedure: Lumbar 3 to lumbar 5 fusion with instrumentation with laminectomy by Dr. Esquivel;  Surgeon: Jose Bourne MD;  Location: [facility];  Service: Orthopedic Spine;  Laterality: N/A;    LUMBAR LAMINECTOMY WITH FUSION N/A 3/2/2020    Procedure: Lumbar 3 to lumbar 4 and lumbar 4 lumbar 5 laminectomy and neural lysis the fusion by orthopedics;  Surgeon: Samuel Esquivel MD;  Location: [facility];  Service: Neurosurgery;  Laterality: N/A;    SPINE SURGERY        Health Maintenance Due   Topic Date Due    LUNG CANCER  SCREENING  07/30/2025        There is no immunization history on file for this patient.      Part of this note may be an electronic transcription/translation of spoken language to printed   text using the Dragon Dictation System.      TONY Hunt

## 2025-08-18 ENCOUNTER — TELEPHONE (OUTPATIENT)
Dept: ADMINISTRATIVE | Facility: OTHER | Age: 65
End: 2025-08-18
Payer: COMMERCIAL

## 2025-08-19 ENCOUNTER — TELEPHONE (OUTPATIENT)
Dept: FAMILY MEDICINE CLINIC | Age: 65
End: 2025-08-19
Payer: COMMERCIAL

## (undated) DEVICE — SOL NACL 0.9PCT 1000ML

## (undated) DEVICE — DRP MICROSCOPE 4 BINOCULAR CV 54X150IN

## (undated) DEVICE — BONE MARROW ASPIRATION NEEDLES ASPIRATOR KIT 3-HOLE 4 INCHES NDLE

## (undated) DEVICE — GLV SURG BIOGEL LTX PF 7 1/2

## (undated) DEVICE — 1 ML TUBERCULIN SYRINGE REGULAR TIP: Brand: MONOJECT

## (undated) DEVICE — SUT MNCRYL PLS ANTIB UD 4/0 PS2 18IN

## (undated) DEVICE — ADHS SKIN DERMABOND TOP ADVANCED

## (undated) DEVICE — ANTIBACTERIAL UNDYED BRAIDED (POLYGLACTIN 910), SYNTHETIC ABSORBABLE SUTURE: Brand: COATED VICRYL

## (undated) DEVICE — SPNG GZ WOVN 4X4IN 12PLY 10/BX STRL

## (undated) DEVICE — APPL CHLORAPREP HI/LITE 26ML ORNG

## (undated) DEVICE — Device

## (undated) DEVICE — TOTAL TRAY, 16FR 10ML SIL FOLEY, URN: Brand: MEDLINE

## (undated) DEVICE — PK ATS CUST W CARDIOTOMY RESEVOIR

## (undated) DEVICE — GLV SURG SENSICARE W/ALOE PF LF 7.5 STRL

## (undated) DEVICE — SPONGE,LAP,12"X12",XR,ST,5/PK,40PK/CS: Brand: MEDLINE

## (undated) DEVICE — GOWN,ECLIPSE,FABRIC-REINFORCED,X-LARGE: Brand: MEDLINE

## (undated) DEVICE — HANDPIECE SET WITH COAXIAL HIGH FLOW TIP AND SUCTION TUBE: Brand: INTERPULSE

## (undated) DEVICE — MEDI-VAC YANKAUER SUCTION HANDLE W/BULBOUS TIP: Brand: CARDINAL HEALTH

## (undated) DEVICE — PAD,ABDOMINAL,8"X10",ST,LF: Brand: MEDLINE

## (undated) DEVICE — GLV SURG PREMIERPRO ORTHO LTX PF SZ8 BRN

## (undated) DEVICE — DRSNG GZ PETROLTM XEROFORM CURAD 1X8IN STRL

## (undated) DEVICE — DISPOSABLE IRRIGATION BIPOLAR CORD, M1000 TYPE: Brand: KIRWAN

## (undated) DEVICE — PK NEURO SPINE 40

## (undated) DEVICE — CODMAN® SURGICAL PATTIES 1/2" X 3" (1.27CM X 7.62CM): Brand: CODMAN®

## (undated) DEVICE — SUT VIC 1 OS8 27IN J699H

## (undated) DEVICE — 6.0MM PRECISION ROUND

## (undated) DEVICE — 3.0MM NEURO (MATCH HEAD) LESS AGGRESSIVE

## (undated) DEVICE — CODMAN® SURGICAL PATTIES 3/4" X 3/4" (1.91CM X 1.91CM): Brand: CODMAN®

## (undated) DEVICE — GOWN,NON-REINFORCED,SIRUS,SET IN SLV,XL: Brand: MEDLINE

## (undated) DEVICE — GLV SURG BIOGEL LTX PF 7

## (undated) DEVICE — THE MILL DISPOSABLE - MEDIUM